# Patient Record
Sex: FEMALE | Race: AMERICAN INDIAN OR ALASKA NATIVE | NOT HISPANIC OR LATINO | Employment: FULL TIME
[De-identification: names, ages, dates, MRNs, and addresses within clinical notes are randomized per-mention and may not be internally consistent; named-entity substitution may affect disease eponyms.]

---

## 2020-11-14 ENCOUNTER — HEALTH MAINTENANCE LETTER (OUTPATIENT)
Age: 30
End: 2020-11-14

## 2021-09-12 ENCOUNTER — HEALTH MAINTENANCE LETTER (OUTPATIENT)
Age: 31
End: 2021-09-12

## 2022-01-02 ENCOUNTER — HEALTH MAINTENANCE LETTER (OUTPATIENT)
Age: 32
End: 2022-01-02

## 2022-11-19 ENCOUNTER — HEALTH MAINTENANCE LETTER (OUTPATIENT)
Age: 32
End: 2022-11-19

## 2023-04-09 ENCOUNTER — HEALTH MAINTENANCE LETTER (OUTPATIENT)
Age: 33
End: 2023-04-09

## 2024-02-13 ENCOUNTER — HOSPITAL ENCOUNTER (EMERGENCY)
Facility: OTHER | Age: 34
Discharge: HOME | End: 2024-02-13
Attending: STUDENT IN AN ORGANIZED HEALTH CARE EDUCATION/TRAINING PROGRAM | Admitting: STUDENT IN AN ORGANIZED HEALTH CARE EDUCATION/TRAINING PROGRAM
Payer: COMMERCIAL

## 2024-02-13 ENCOUNTER — APPOINTMENT (OUTPATIENT)
Dept: ULTRASOUND IMAGING | Facility: OTHER | Age: 34
End: 2024-02-13
Attending: STUDENT IN AN ORGANIZED HEALTH CARE EDUCATION/TRAINING PROGRAM
Payer: COMMERCIAL

## 2024-02-13 VITALS
HEIGHT: 68 IN | TEMPERATURE: 99.1 F | SYSTOLIC BLOOD PRESSURE: 120 MMHG | RESPIRATION RATE: 18 BRPM | DIASTOLIC BLOOD PRESSURE: 79 MMHG | HEART RATE: 74 BPM | WEIGHT: 190 LBS | OXYGEN SATURATION: 100 % | BODY MASS INDEX: 28.79 KG/M2

## 2024-02-13 DIAGNOSIS — O03.9 MISCARRIAGE: ICD-10-CM

## 2024-02-13 LAB
ABO/RH(D): NORMAL
ALBUMIN UR-MCNC: NEGATIVE MG/DL
ANTIBODY SCREEN: NEGATIVE
APPEARANCE UR: CLEAR
BASOPHILS # BLD AUTO: 0 10E3/UL (ref 0–0.2)
BASOPHILS NFR BLD AUTO: 1 %
BILIRUB UR QL STRIP: NEGATIVE
COLOR UR AUTO: YELLOW
EOSINOPHIL # BLD AUTO: 0.1 10E3/UL (ref 0–0.7)
EOSINOPHIL NFR BLD AUTO: 1 %
ERYTHROCYTE [DISTWIDTH] IN BLOOD BY AUTOMATED COUNT: 11.7 % (ref 10–15)
GLUCOSE UR STRIP-MCNC: NEGATIVE MG/DL
HCG UR QL: POSITIVE
HCT VFR BLD AUTO: 40.5 % (ref 35–47)
HGB BLD-MCNC: 14.1 G/DL (ref 11.7–15.7)
HGB UR QL STRIP: ABNORMAL
HOLD SPECIMEN: NORMAL
IMM GRANULOCYTES # BLD: 0 10E3/UL
IMM GRANULOCYTES NFR BLD: 0 %
KETONES UR STRIP-MCNC: NEGATIVE MG/DL
LEUKOCYTE ESTERASE UR QL STRIP: NEGATIVE
LYMPHOCYTES # BLD AUTO: 1.2 10E3/UL (ref 0.8–5.3)
LYMPHOCYTES NFR BLD AUTO: 16 %
MCH RBC QN AUTO: 31.5 PG (ref 26.5–33)
MCHC RBC AUTO-ENTMCNC: 34.8 G/DL (ref 31.5–36.5)
MCV RBC AUTO: 91 FL (ref 78–100)
MONOCYTES # BLD AUTO: 0.4 10E3/UL (ref 0–1.3)
MONOCYTES NFR BLD AUTO: 6 %
NEUTROPHILS # BLD AUTO: 5.9 10E3/UL (ref 1.6–8.3)
NEUTROPHILS NFR BLD AUTO: 76 %
NITRATE UR QL: NEGATIVE
NRBC # BLD AUTO: 0 10E3/UL
NRBC BLD AUTO-RTO: 0 /100
PH UR STRIP: 5.5 [PH] (ref 5–9)
PLATELET # BLD AUTO: 220 10E3/UL (ref 150–450)
RBC # BLD AUTO: 4.47 10E6/UL (ref 3.8–5.2)
RBC URINE: <1 /HPF
SP GR UR STRIP: 1.01 (ref 1–1.03)
SPECIMEN EXPIRATION DATE: NORMAL
UROBILINOGEN UR STRIP-MCNC: NORMAL MG/DL
WBC # BLD AUTO: 7.7 10E3/UL (ref 4–11)
WBC URINE: <1 /HPF

## 2024-02-13 PROCEDURE — 85041 AUTOMATED RBC COUNT: CPT | Performed by: STUDENT IN AN ORGANIZED HEALTH CARE EDUCATION/TRAINING PROGRAM

## 2024-02-13 PROCEDURE — 76817 TRANSVAGINAL US OBSTETRIC: CPT

## 2024-02-13 PROCEDURE — 250N000013 HC RX MED GY IP 250 OP 250 PS 637: Performed by: STUDENT IN AN ORGANIZED HEALTH CARE EDUCATION/TRAINING PROGRAM

## 2024-02-13 PROCEDURE — 81001 URINALYSIS AUTO W/SCOPE: CPT | Performed by: STUDENT IN AN ORGANIZED HEALTH CARE EDUCATION/TRAINING PROGRAM

## 2024-02-13 PROCEDURE — 76801 OB US < 14 WKS SINGLE FETUS: CPT

## 2024-02-13 PROCEDURE — 99284 EMERGENCY DEPT VISIT MOD MDM: CPT | Performed by: STUDENT IN AN ORGANIZED HEALTH CARE EDUCATION/TRAINING PROGRAM

## 2024-02-13 PROCEDURE — 81025 URINE PREGNANCY TEST: CPT | Performed by: STUDENT IN AN ORGANIZED HEALTH CARE EDUCATION/TRAINING PROGRAM

## 2024-02-13 PROCEDURE — 36415 COLL VENOUS BLD VENIPUNCTURE: CPT | Performed by: STUDENT IN AN ORGANIZED HEALTH CARE EDUCATION/TRAINING PROGRAM

## 2024-02-13 PROCEDURE — 86900 BLOOD TYPING SEROLOGIC ABO: CPT | Performed by: STUDENT IN AN ORGANIZED HEALTH CARE EDUCATION/TRAINING PROGRAM

## 2024-02-13 PROCEDURE — 99284 EMERGENCY DEPT VISIT MOD MDM: CPT | Mod: 25

## 2024-02-13 RX ORDER — MISOPROSTOL 100 UG/1
600 TABLET ORAL ONCE
Status: COMPLETED | OUTPATIENT
Start: 2024-02-13 | End: 2024-02-13

## 2024-02-13 RX ADMIN — MISOPROSTOL 600 MCG: 100 TABLET ORAL at 18:24

## 2024-02-13 NOTE — ED PROVIDER NOTES
"  History     Chief Complaint   Patient presents with    Vaginal Bleeding - Pregnant       Flaquita Shetty is a 33 year old year  presenting with vaginal bleeding in pregnancy. This started approximately 3 hours ago starting with pinkish discharge and then she had another episode an hour or 2 later that was dripping blood.  She is not on any pads yet.  Severity described as mild.  Gestation age 13 weeks.  LMP .  Last intercourse 3 days ago.  She has some mild intermittent right lower quadrant pain.  Declines any medication for this currently.  She has been having slightly increased vaginal discharge that she has been having throughout this pregnancy today.  There is no back pain, nausea, vomiting, dysuria, light headedness. Has not attended prenatal obstetric care appointments. No known pregnancy complications to date.     Allergies   Allergen Reactions    Nitrofurantoin Hives     hives    Sulfa Antibiotics Hives and Swelling     States her lips became swollen. thrush on lips and hives form sulfa       There are no problems to display for this patient.      No past medical history on file.    No past surgical history on file.    No family history on file.         Medications:    Prenatal Vit-Fe Fumarate-FA (PRENATAL MULTIVITAMIN  PLUS IRON) 27-1 MG TABS        Review of Systems: See HPI for pertinent negatives and positives. All other systems reviewed and found to be negative.    Physical Exam   /79   Pulse 74   Temp 99.1  F (37.3  C) (Tympanic)   Resp 18   Ht 1.727 m (5' 8\")   Wt 86.2 kg (190 lb)   SpO2 100%   BMI 28.89 kg/m       General: awake, comfortable  HEENT: atraumatic  Respiratory: normal effort, clear to auscultation bilaterally  Cardiovascular: regular rate and rhythm, no murmurs  Abdomen: soft, nondistended, nontender, no guarding or rebound  Extremities: no deformities, edema, or tenderness  Skin: warm, dry, no rashes  Neuro: alert, no focal deficits  Pelvic: Deferred    ED " Course           Results for orders placed or performed during the hospital encounter of 02/13/24 (from the past 24 hour(s))   ABO/Rh type and screen    Narrative    The following orders were created for panel order ABO/Rh type and screen.  Procedure                               Abnormality         Status                     ---------                               -----------         ------                     Adult Type and Screen[[id number]]                            Final result                 Please view results for these tests on the individual orders.   CBC with platelets differential    Narrative    The following orders were created for panel order CBC with platelets differential.  Procedure                               Abnormality         Status                     ---------                               -----------         ------                     CBC with platelets and d...[[id number]]                      Final result                 Please view results for these tests on the individual orders.   Extra Tube    Narrative    The following orders were created for panel order Extra Tube.  Procedure                               Abnormality         Status                     ---------                               -----------         ------                     Extra Blue Top Tube[[id number]]                              Final result               Extra Serum Separator Tu...[[id number]]                      Final result                 Please view results for these tests on the individual orders.   Extra Tube    Narrative    The following orders were created for panel order Extra Tube.  Procedure                               Abnormality         Status                     ---------                               -----------         ------                     Extra Green Top (Lithium...[[id number]]                      Final result                 Please view results for these tests on the individual orders.    Extra Tube    Narrative    The following orders were created for panel order Extra Tube.  Procedure                               Abnormality         Status                     ---------                               -----------         ------                     Extra Heparinized Syringe[[id number]]                                                     Please view results for these tests on the individual orders.   Extra Blue Top Tube   Result Value Ref Range    Hold Specimen JIC    Extra Serum Separator Tube (SST)   Result Value Ref Range    Hold Specimen JIC    Extra Green Top (Lithium Heparin) Tube   Result Value Ref Range    Hold Specimen JIC    Adult Type and Screen   Result Value Ref Range    ABO/RH(D) A POS     Antibody Screen Negative Negative    SPECIMEN EXPIRATION DATE [id number]    CBC with platelets and differential   Result Value Ref Range    WBC Count 7.7 4.0 - 11.0 10e3/uL    RBC Count 4.47 3.80 - 5.20 10e6/uL    Hemoglobin 14.1 11.7 - 15.7 g/dL    Hematocrit 40.5 35.0 - 47.0 %    MCV 91 78 - 100 fL    MCH 31.5 26.5 - 33.0 pg    MCHC 34.8 31.5 - 36.5 g/dL    RDW 11.7 10.0 - 15.0 %    Platelet Count 220 150 - 450 10e3/uL    % Neutrophils 76 %    % Lymphocytes 16 %    % Monocytes 6 %    % Eosinophils 1 %    % Basophils 1 %    % Immature Granulocytes 0 %    NRBCs per 100 WBC 0 <1 /100    Absolute Neutrophils 5.9 1.6 - 8.3 10e3/uL    Absolute Lymphocytes 1.2 0.8 - 5.3 10e3/uL    Absolute Monocytes 0.4 0.0 - 1.3 10e3/uL    Absolute Eosinophils 0.1 0.0 - 0.7 10e3/uL    Absolute Basophils 0.0 0.0 - 0.2 10e3/uL    Absolute Immature Granulocytes 0.0 <=0.4 10e3/uL    Absolute NRBCs 0.0 10e3/uL   UA with Microscopic reflex to Culture    Specimen: Urine, Clean Catch   Result Value Ref Range    Color Urine Yellow Colorless, Straw, Light Yellow, Yellow    Appearance Urine Clear Clear    Glucose Urine Negative Negative mg/dL    Bilirubin Urine Negative Negative    Ketones Urine Negative Negative mg/dL     Specific Gravity Urine 1.007 1.000 - 1.030    Blood Urine Trace (A) Negative    pH Urine 5.5 5.0 - 9.0    Protein Albumin Urine Negative Negative mg/dL    Urobilinogen Urine Normal Normal, 2.0 mg/dL    Nitrite Urine Negative Negative    Leukocyte Esterase Urine Negative Negative    RBC Urine <1 <=2 /HPF    WBC Urine <1 <=5 /HPF    Narrative    Urine Culture not indicated   HCG qualitative urine   Result Value Ref Range    hCG Urine Qualitative Positive (A) Negative   US OB Transvaginal Only    Narrative    US OB TRANSVAGINAL ONLY    HISTORY: 1st trimester bleed. estimated 13 wk gestation .    COMPARISON: None.    TECHNIQUE: Transvaginal ultrasound of pelvis.    FINDINGS:    There is an intrauterine gestational sac containing a fetal pole with  a 15 mm crown rump length. No fetal cardiac motion is seen.       Impression    IMPRESSION:     Failure of early intrauterine pregnancy/missed .       RONAN JAMESON MD         SYSTEM ID:  [id number]       Medications   misoprostol (CYTOTEC) tablet 600 mcg (600 mcg Buccal $Given 24)       Assessments & Plan (with Medical Decision Making)     I have reviewed the nursing notes.    Evaluated for vaginal bleed in pregnancy. Vitals and hemoglobin unremarkable. Transvaginal ultrasound demonstrating non-viable pregnancy consistent with miscarriage/spontaneous . Rh positive and therefore Rhogam not given.  OB consult with recommendation for urgent OB follow up and can offer Cytotec 600 mcg buccal which was accepted by patient here in the ED.  OB referral placed.  Discharged home with attached instructions on diagnosis provided including ED return precautions.     I have reviewed the findings, diagnosis, plan and need for follow up with the patient.    Patient instructions:   Once again we are sorry about your miscarriage. You will be contacted very soon by OB/GYN to schedule follow-up appointment.    Please review attached instructions including reasons  to return to the emergency department.     Discharge Medication List as of 2/13/2024  6:21 PM          Final diagnoses:   Miscarriage       2/13/2024   [facility] [place]       Jonas Palomino MD  02/13/24 [id number]

## 2024-02-13 NOTE — ED TRIAGE NOTES
"Pt presents to ED with c/o vaginal bleeding at 13 wks gestation. Pt reports \"pink\" upon wiping this AM. Pt went to the bathroom this afternoon and reports \"blood dripping into the toilet.\" Pt denies clots. Pt has 4/10 RLQ pain.      Triage Assessment (Adult)       Row Name 02/13/24 1532          Cardiac WDL    Cardiac WDL WDL        Peripheral/Neurovascular WDL    Peripheral Neurovascular WDL WDL        Cognitive/Neuro/Behavioral WDL    Cognitive/Neuro/Behavioral WDL WDL                     "

## 2024-02-14 NOTE — DISCHARGE INSTRUCTIONS
Once again we are sorry about your miscarriage. You will be contacted very soon by OB/GYN to schedule follow-up appointment.    Please review attached instructions including reasons to return to the emergency department.

## 2024-02-15 ENCOUNTER — OFFICE VISIT (OUTPATIENT)
Dept: OBGYN | Facility: OTHER | Age: 34
End: 2024-02-15
Attending: STUDENT IN AN ORGANIZED HEALTH CARE EDUCATION/TRAINING PROGRAM
Payer: COMMERCIAL

## 2024-02-15 VITALS
WEIGHT: 188 LBS | DIASTOLIC BLOOD PRESSURE: 80 MMHG | BODY MASS INDEX: 28.59 KG/M2 | SYSTOLIC BLOOD PRESSURE: 132 MMHG | HEART RATE: 80 BPM

## 2024-02-15 DIAGNOSIS — O03.9 MISCARRIAGE: ICD-10-CM

## 2024-02-15 LAB — HCG INTACT+B SERPL-ACNC: 278 MIU/ML

## 2024-02-15 PROCEDURE — 36415 COLL VENOUS BLD VENIPUNCTURE: CPT | Mod: ZL

## 2024-02-15 PROCEDURE — 99203 OFFICE O/P NEW LOW 30 MIN: CPT

## 2024-02-15 PROCEDURE — 84702 CHORIONIC GONADOTROPIN TEST: CPT | Mod: ZL

## 2024-02-15 ASSESSMENT — PATIENT HEALTH QUESTIONNAIRE - PHQ9
10. IF YOU CHECKED OFF ANY PROBLEMS, HOW DIFFICULT HAVE THESE PROBLEMS MADE IT FOR YOU TO DO YOUR WORK, TAKE CARE OF THINGS AT HOME, OR GET ALONG WITH OTHER PEOPLE: VERY DIFFICULT
SUM OF ALL RESPONSES TO PHQ QUESTIONS 1-9: 17
SUM OF ALL RESPONSES TO PHQ QUESTIONS 1-9: 17

## 2024-02-15 NOTE — NURSING NOTE
Chief Complaint   Patient presents with    Follow Up     Miscarriage        Medication Reconciliation: complete        Shazia Woodward, NALDON

## 2024-02-15 NOTE — PROGRESS NOTES
OBGYN Office Visit    Chief Complaint: Miscarriage    History of Present Illness:  Ms. Flaquita Shetty is a 33 year old yo  here today due to concerns for a miscarriage. She reports she was in the ED and confirmed to have a miscarriage. Review of ED note shows that patient was noted to have a nonviable pregnancy and was given cytotec for passage. She reports she passed tissue last night. She is still having some bleeidng with some clots. Nothing overly concerning.     Past Medical History:  History reviewed. No pertinent past medical history.  She denies any chronic medical conditions: specifically denies asthma, HTN, DM    OB History:  2 term deliveries , .        GYN History:  No history of STIs  Last pap smear  Nil/HPV-   No history of abnormal pap smears    Past Surgical History:  No past surgical history on file.    Family History:  No family history on file.  Specifically denies breast, ovarian, endometrial and colon cancers in her family  She also is not aware of any familial thrombophilias and coagulopathies in her family    Social History:  Social History     Socioeconomic History    Marital status: Single     Spouse name: Not on file    Number of children: Not on file    Years of education: Not on file    Highest education level: Not on file   Occupational History    Not on file   Tobacco Use    Smoking status: Former     Types: Cigarettes    Smokeless tobacco: Never   Vaping Use    Vaping Use: Former   Substance and Sexual Activity    Alcohol use: Not Currently    Drug use: Never    Sexual activity: Yes   Other Topics Concern    Not on file   Social History Narrative    Not on file     Social Determinants of Health     Financial Resource Strain: Not on file   Food Insecurity: Not on file   Transportation Needs: Not on file   Physical Activity: Not on file   Stress: Not on file   Social Connections: Not on file   Interpersonal Safety: Not on file   Housing Stability: Not on file     She  lives at home with significant other and children.   No tobacco, alcohol or drug use in this pregnancy    Exam:  /80   Pulse 80   Wt 85.3 kg (188 lb)   BMI 28.59 kg/m    Physical Exam  /80   Pulse 80   Wt 85.3 kg (188 lb)   BMI 28.59 kg/m    Gen: Well-appearing, no acute distressed, well-groomed, alert  Pulm: nonlabored easily talks in full sentences      Labs:  hCG Urine Qualitative   Date Value Ref Range Status   2024 Positive (A) Negative Final     Comment:     This test is for screening purposes.  Results should be interpreted along with the clinical picture.  Confirmation testing is available if warranted by ordering [id number], HCG Quantitative Pregnancy.       US results:  24: there is an intrauterine gestational sac containing a fetal pole with  a 15 mm crown rump length. No fetal cardiac motion is seen.     Assessment:  Ms. Flaquita Shetty is a 33 year old yo  here today following miscarriage with cytotec use for passage. She notes she passed tissue at home last night. She is A+ blood type.       Plan:  # Failed pregnancy: spontaneous miscarriage  We discussed that a great amount of miscarriages are caused due to genetic abnormalities of fetus. We also discussed possibility of increased risk of future miscarriages as well. Recommend HCG level today to have baseline. Discussed expected bleeding patterns and when to contact the office. She is in agreement with this plan. IF patient is to have positive pregnancy test in the future recommend serial HCGs for trending.       VIVIAN Marino CNP on 2/15/2024 at 9:20 AM

## 2024-04-30 ENCOUNTER — HOSPITAL ENCOUNTER (OUTPATIENT)
Dept: GENERAL RADIOLOGY | Facility: OTHER | Age: 34
Discharge: HOME | End: 2024-04-30
Attending: STUDENT IN AN ORGANIZED HEALTH CARE EDUCATION/TRAINING PROGRAM
Payer: COMMERCIAL

## 2024-04-30 ENCOUNTER — OFFICE VISIT (OUTPATIENT)
Dept: FAMILY MEDICINE | Facility: OTHER | Age: 34
End: 2024-04-30
Attending: NURSE PRACTITIONER
Payer: COMMERCIAL

## 2024-04-30 VITALS
WEIGHT: 196.2 LBS | OXYGEN SATURATION: 97 % | RESPIRATION RATE: 14 BRPM | DIASTOLIC BLOOD PRESSURE: 74 MMHG | SYSTOLIC BLOOD PRESSURE: 108 MMHG | HEART RATE: 64 BPM | HEIGHT: 68 IN | BODY MASS INDEX: 29.73 KG/M2 | TEMPERATURE: 99.5 F

## 2024-04-30 DIAGNOSIS — M54.42 ACUTE BILATERAL LOW BACK PAIN WITH LEFT-SIDED SCIATICA: ICD-10-CM

## 2024-04-30 DIAGNOSIS — M54.42 ACUTE BILATERAL LOW BACK PAIN WITH LEFT-SIDED SCIATICA: Primary | ICD-10-CM

## 2024-04-30 LAB — HCG UR QL: NEGATIVE

## 2024-04-30 PROCEDURE — 99213 OFFICE O/P EST LOW 20 MIN: CPT | Performed by: STUDENT IN AN ORGANIZED HEALTH CARE EDUCATION/TRAINING PROGRAM

## 2024-04-30 PROCEDURE — 250N000011 HC RX IP 250 OP 636

## 2024-04-30 PROCEDURE — 96372 THER/PROPH/DIAG INJ SC/IM: CPT

## 2024-04-30 PROCEDURE — 81025 URINE PREGNANCY TEST: CPT | Mod: ZL | Performed by: STUDENT IN AN ORGANIZED HEALTH CARE EDUCATION/TRAINING PROGRAM

## 2024-04-30 PROCEDURE — 72100 X-RAY EXAM L-S SPINE 2/3 VWS: CPT

## 2024-04-30 RX ORDER — KETOROLAC TROMETHAMINE 30 MG/ML
30 INJECTION, SOLUTION INTRAMUSCULAR; INTRAVENOUS ONCE
Status: COMPLETED | OUTPATIENT
Start: 2024-04-30 | End: 2024-04-30

## 2024-04-30 RX ADMIN — KETOROLAC TROMETHAMINE 30 MG: 30 INJECTION, SOLUTION INTRAMUSCULAR at 11:40

## 2024-04-30 ASSESSMENT — PAIN SCALES - GENERAL: PAINLEVEL: EXTREME PAIN (8)

## 2024-04-30 NOTE — NURSING NOTE
"Chief Complaint   Patient presents with    Back Pain     Low back- down to hips and bottom     Patient presents today with low back pain on left side that shoots down into her hip and bottom. She said that her left cheek is numb. This began yesterday, she doesn't know what could have caused this. However, she did mention her family has a history of disgenerative disc disease. She also has had spinal fusions done in her upper neck.       Initial /74 (BP Location: Left arm, Patient Position: Sitting, Cuff Size: Adult Large)   Pulse 64   Temp 99.5  F (37.5  C) (Tympanic)   Resp 14   Ht 1.727 m (5' 8\")   Wt 89 kg (196 lb 3.2 oz)   LMP 04/14/2024 (Exact Date)   SpO2 97%   Breastfeeding No   BMI 29.83 kg/m   Estimated body mass index is 29.83 kg/m  as calculated from the following:    Height as of this encounter: 1.727 m (5' 8\").    Weight as of this encounter: 89 kg (196 lb 3.2 oz).     FOOD SECURITY SCREENING QUESTIONS:    The next two questions are to help us understand your food security.  If you are feeling you need any assistance in this area, we have resources available to support you today.    Hunger Vital Signs:  Within the past 12 months we worried whether our food would run out before we got money to buy more. Never  Within the past 12 months the food we bought just didn't last and we didn't have money to get more. Never      Fortunato Simon    "

## 2024-04-30 NOTE — PROGRESS NOTES
Assessment & Plan     (M54.42) Acute bilateral low back pain with left-sided sciatica  (primary encounter diagnosis)    Comment: Low back pain, left-sided sciatica.  This happened after standing from a seated position on the floor.  Possible strain vs. Disc/nerve involvement.  With history of cervical abnormalities, x-ray imaging was obtained of the lower back today.  There is some loss of disc height between L5 and S1 per radiologist read.  She does describe symptoms of sciatica.  No evidence of pudendal nerve involvement at this time.  She is afebrile, not tachycardic. Not on contraceptives, HCG negative at this time.     Plan: XR Lumbar Spine 2/3 Views, Pregnancy, Urine         (HCG), ketorolac (TORADOL) injection 30 mg     Toradol 30 mg was given in the office today.  Continue NSAIDs and Tylenol at home.  Topical heat and/or ice.  Follow-up with primary care for persisting symptoms.  Go to the ER for worsening symptoms.  She is comfortable and agreeable with this plan.      Bambi Sams is a 34 year old, presenting for the following health issues:  Back Pain (Low back- down to hips and bottom)    HPI     Patient presents today with concerns of low back pain that now goes down into her left hip as well as buttock.  She states some numbness into the left buttock.  Some difficulty with standing from the chair, walking due to the pain.  She states that started yesterday after she was trying to stand up from being on the floor.  She notes she did take ibuprofen yesterday and today a couple hours ago.  She has also been using heat and ice.      No fevers, skin color changes.  No loss of bowel or bladder nor numbness into the groin.      Review of Systems  Constitutional, HEENT, cardiovascular, pulmonary, gi and gu systems are negative, except as otherwise noted.          Objective    /74 (BP Location: Left arm, Patient Position: Sitting, Cuff Size: Adult Large)   Pulse 64   Temp 99.5  F (37.5  C)  "(Tympanic)   Resp 14   Ht 1.727 m (5' 8\")   Wt 89 kg (196 lb 3.2 oz)   LMP 04/14/2024 (Exact Date)   SpO2 97%   Breastfeeding No   BMI 29.83 kg/m    Body mass index is 29.83 kg/m .    Physical Exam   GENERAL: alert and no distress  RESP: no increased work of breathing  MS: no gross musculoskeletal defects noted, no edema, ecchymosis.  Tenderness palpation over the lower spine extending laterally to the left SI joint space, no tenderness in the right joint space, decreased range of motion with extension of the spine, appropriate range of motion with flexion of the spine, increased pain with sidebending as well as lateral rotation of the spine, ambulation even and symmetric, no foot drop, patellar reflexes 2+, light touch sensation intact in distal lower extremities bilaterally    Results for orders placed or performed during the hospital encounter of 04/30/24   XR Lumbar Spine 2/3 Views     Status: None    Narrative    EXAM: XR LUMBAR SPINE 2/3 VIEWS, 4/30/2024 11:53 AM     HISTORY: acute pain in lower back, radiating to left side; Acute  bilateral low back pain with left-sided sciatica    TECHNIQUE: AP, lateral and L5-S1 coned in view of the lumbar spine    COMPARISON: None.    FINDINGS:    5 lumbar type vertebral bodies are identified. There is no acute  osseous abnormality. Normal lordotic curvature of the lumbar spine is  preserved. The vertebral body alignment is maintained.    Moderate disc space loss at L5-S1. Mild to moderate facet hypertrophy  at L5-S1.     Lung bases are clear. Nonobstructive bowel gas pattern.      Impression    IMPRESSION:  Lumbar spondylosis centered around L5-S1. No acute osseous  abnormality.    GABRIEL HAMPTON MD         SYSTEM ID:  [id number]   Results for orders placed or performed in visit on 04/30/24   Pregnancy, Urine (HCG)     Status: Normal   Result Value Ref Range    hCG Urine Qualitative Negative Negative           Signed Electronically by: Rivka Cai PA-C    "

## 2024-04-30 NOTE — PATIENT INSTRUCTIONS
Low back pain    Toradol 30 mg was given in the office today.  No NSAIDs until this evening.  After that, resume ibuprofen or naproxen.  Take with food.    Tylenol 1000 mg every 6 hours as needed.    Heat, ice, topical application of medications as needed.    Follow-up with primary care in 1-2 weeks.    Go to the ER for worsening symptoms.

## 2024-05-10 ENCOUNTER — OFFICE VISIT (OUTPATIENT)
Dept: FAMILY MEDICINE | Facility: OTHER | Age: 34
End: 2024-05-10
Attending: PHYSICIAN ASSISTANT
Payer: COMMERCIAL

## 2024-05-10 VITALS
TEMPERATURE: 97.8 F | BODY MASS INDEX: 29.5 KG/M2 | RESPIRATION RATE: 16 BRPM | WEIGHT: 194 LBS | DIASTOLIC BLOOD PRESSURE: 70 MMHG | SYSTOLIC BLOOD PRESSURE: 120 MMHG | HEART RATE: 72 BPM

## 2024-05-10 DIAGNOSIS — M54.42 ACUTE BILATERAL LOW BACK PAIN WITH LEFT-SIDED SCIATICA: Primary | ICD-10-CM

## 2024-05-10 PROCEDURE — 99213 OFFICE O/P EST LOW 20 MIN: CPT | Performed by: PHYSICIAN ASSISTANT

## 2024-05-10 RX ORDER — PREDNISONE 20 MG/1
TABLET ORAL
Qty: 10 TABLET | Refills: 0 | Status: SHIPPED | OUTPATIENT
Start: 2024-05-10

## 2024-05-10 RX ORDER — METHOCARBAMOL 500 MG/1
500 TABLET, FILM COATED ORAL 4 TIMES DAILY PRN
Qty: 60 TABLET | Refills: 0 | Status: SHIPPED | OUTPATIENT
Start: 2024-05-10

## 2024-05-10 RX ORDER — MELOXICAM 15 MG/1
15 TABLET ORAL DAILY
Qty: 30 TABLET | Refills: 0 | Status: SHIPPED | OUTPATIENT
Start: 2024-05-10

## 2024-05-10 ASSESSMENT — ANXIETY QUESTIONNAIRES
7. FEELING AFRAID AS IF SOMETHING AWFUL MIGHT HAPPEN: NOT AT ALL
5. BEING SO RESTLESS THAT IT IS HARD TO SIT STILL: NOT AT ALL
4. TROUBLE RELAXING: NOT AT ALL
2. NOT BEING ABLE TO STOP OR CONTROL WORRYING: NOT AT ALL
6. BECOMING EASILY ANNOYED OR IRRITABLE: NOT AT ALL
3. WORRYING TOO MUCH ABOUT DIFFERENT THINGS: NOT AT ALL
7. FEELING AFRAID AS IF SOMETHING AWFUL MIGHT HAPPEN: NOT AT ALL
GAD7 TOTAL SCORE: 0
1. FEELING NERVOUS, ANXIOUS, OR ON EDGE: NOT AT ALL
8. IF YOU CHECKED OFF ANY PROBLEMS, HOW DIFFICULT HAVE THESE MADE IT FOR YOU TO DO YOUR WORK, TAKE CARE OF THINGS AT HOME, OR GET ALONG WITH OTHER PEOPLE?: NOT DIFFICULT AT ALL
GAD7 TOTAL SCORE: 0
IF YOU CHECKED OFF ANY PROBLEMS ON THIS QUESTIONNAIRE, HOW DIFFICULT HAVE THESE PROBLEMS MADE IT FOR YOU TO DO YOUR WORK, TAKE CARE OF THINGS AT HOME, OR GET ALONG WITH OTHER PEOPLE: NOT DIFFICULT AT ALL
GAD7 TOTAL SCORE: 0

## 2024-05-10 ASSESSMENT — PATIENT HEALTH QUESTIONNAIRE - PHQ9
SUM OF ALL RESPONSES TO PHQ QUESTIONS 1-9: 2
10. IF YOU CHECKED OFF ANY PROBLEMS, HOW DIFFICULT HAVE THESE PROBLEMS MADE IT FOR YOU TO DO YOUR WORK, TAKE CARE OF THINGS AT HOME, OR GET ALONG WITH OTHER PEOPLE: NOT DIFFICULT AT ALL
SUM OF ALL RESPONSES TO PHQ QUESTIONS 1-9: 2

## 2024-05-10 ASSESSMENT — PAIN SCALES - GENERAL: PAINLEVEL: SEVERE PAIN (7)

## 2024-05-10 NOTE — PATIENT INSTRUCTIONS
Please refer to your AVS for follow up and pain/symptoms management recommendations (I.e.: medications, helpful conservative treatment modalities, appropriate follow up if need to a specialist or family practice, etc.). Please return to urgent care if your symptoms change or worsen.     Back Pain/Strain - you were seen in the urgent care today for back pain - your AVS will contain additional information as well in regards to your diagnosis.   -For back pain, we recommend alternating tylenol and Ibuprofen as needed (if you are able to take this medications the recommendation is to alternate every 4 hours as needed. I.e.: Ibuprofen at 8am, Tylenol 12pm, Ibuprofen 4pm, etc.).    -Daily maximum of Tylenol is 4000mg (recommend staying under 3000mg)   -Daily maximum of Ibuprofen is 3200 mg  --If prescribed Toradol, you may alternate this with Tylenol every 4-6 hours, please do NOT take any additional antiinflammatories such as Naproxen/Aleve, Motrin/Ibuprofen as these are in the same antiinflammatory class as Toradol  -You may have been placed on a muscle relaxer and/or steroid as well, take these as directed.   -Rest and heat are recommended. You may also try topical Capsaicin - may get hot, so test it on a smaller area of skin    EXAM: XR LUMBAR SPINE 2/3 VIEWS, 4/30/2024 11:53 AM      HISTORY: acute pain in lower back, radiating to left side; Acute  bilateral low back pain with left-sided sciatica     TECHNIQUE: AP, lateral and L5-S1 coned in view of the lumbar spine     COMPARISON: None.     FINDINGS:     5 lumbar type vertebral bodies are identified. There is no acute  osseous abnormality. Normal lordotic curvature of the lumbar spine is  preserved. The vertebral body alignment is maintained.     Moderate disc space loss at L5-S1. Mild to moderate facet hypertrophy  at L5-S1.      Lung bases are clear. Nonobstructive bowel gas pattern.                                                                       IMPRESSION:  Lumbar spondylosis centered around L5-S1. No acute osseous  abnormality.  - If placed on a steroid, please take in the morning with food  - If placed on a muscle relaxer - please avoid driving while on medication as may cause sedation  - Be mindful of lifting and posture.  - As soon as tolerated, stretch.  - Consider chiropractor, massage, acupuncture.    * Most back pain will go away on it's own in 6-8 weeks. Follow up with family practice with persistent symptoms in 7-10 days.   ** Must be rechecked with: numbness in groin, loss of bowel or bladder function.

## 2024-05-10 NOTE — PROGRESS NOTES
"  Assessment & Plan       ICD-10-CM    1. Acute bilateral low back pain with left-sided sciatica  M54.42 methocarbamol (ROBAXIN) 500 MG tablet     predniSONE (DELTASONE) 20 MG tablet     meloxicam (MOBIC) 15 MG tablet        Vital stable. Acute sciatic flare of lumbosacral spine. Reviewed imaging personally from x-rays on 4/30/24 showing mild L5-S1 loss of disc height, otherwise, imaging is stable. Will place on corticosteroid: prednisone, muscle relaxer: methocarbamol (Robaxin). Patient to take Prednisone in AM as can cause/lead to increase energy level. Discussed not driving/operating machinery while on muscle relaxer as can be sedating. Recommended ice and heat are recommended and topical Capsaicin. Discussed to be mindful of lifting and posture. May consider chiropractor, massage, acupuncture. May use over-the-counter Tylenol or ibuprofen PRN. If fevers, chills, worsening pains or signs of cauda equina syndrome occur (saddle anesthesia, loss of bowel or bladder function, etc.) patient agreeable to follow up promptly. Discussed that back pain can last 6-8 weeks. Patient is in agreement and understanding of the above treatment plan. All questions and concerns were addressed and answered to patient's satisfaction. AVS reviewed with patient.     BMI  Estimated body mass index is 29.5 kg/m  as calculated from the following:    Height as of 4/30/24: 1.727 m (5' 8\").    Weight as of this encounter: 88 kg (194 lb).   Weight management plan: Discussed healthy diet and exercise guidelines    See Patient Instructions    Return if symptoms worsen or fail to improve.    Bambi Sams is a 34 year old, presenting for the following health issues:  Musculoskeletal Problem        5/10/2024    12:37 PM   Additional Questions   Roomed by tennille acevedo lpn   Accompanied by son     Musculoskeletal Problem  This is a new problem. The current episode started 1 to 4 weeks ago. The problem occurs constantly. The problem has been unchanged. " "Nothing aggravates the symptoms. She has tried NSAIDs, position changes, rest, heat and ice for the symptoms. The treatment provided mild relief.      visit 4/30/24 low back pain onset on 4/29/24. Sciatica pain down buttock. Difficulty walking and rising from seated position. + heat, ice and NSAIDS. X-ray showing mild L5-S1 disc height loss per radiology from films on 4/30/24. Given toradol 30 mg in  and to continue RICE and heat therapy.     Today, pain continues to be in the low back, midline, with left sided sciatica that radiates from left buttock down to mid lateral thigh, then down to left lateral foot. Pain is worse with positional changes and prolonged sitting. No falls, injuries or trauma. No popping, snapping or crunching. No previous surgeries. She had tried chiropractic care - [facility], she is going every 2 days, for manipulations/adjustments and \"shock therapy\". + previous cervical fusion, did well on Robaxin/Methocarbamol after surgery.     No fevers, weight loss, bowel / bladder incontinence or localized weakness.    Review of Systems  Constitutional, HEENT, cardiovascular, pulmonary, GI, , musculoskeletal, neuro, skin, endocrine and psych systems are negative, except as otherwise noted.      Objective    /70 (BP Location: Right arm, Patient Position: Sitting, Cuff Size: Adult Large)   Pulse 72   Temp 97.8  F (36.6  C) (Tympanic)   Resp 16   Wt 88 kg (194 lb)   LMP 04/14/2024 (Exact Date)   BMI 29.50 kg/m    Body mass index is 29.5 kg/m .  Physical Exam   GENERAL: alert and no distress  EYES: Eyes grossly normal to inspection, PERRL and conjunctivae and sclerae normal  RESP: lungs clear to auscultation - no rales, rhonchi or wheezes  CV: regular rate and rhythm, normal S1 S2, no S3 or S4, no murmur, click or rub, no peripheral edema  MS:   Thoracic/Lumbar Spine:  Inspection:    Lordosis: Normal   Kyphosis: Normal  Tenderness: lumbar spinous processes, left para lumbar " muscles  ROM: full lateral bending and rotation of the thoracolumbar spine. Mildly diminished flexion and extension.   Strength: gastrocsoleus   5/5, hamstrings  5/5, quadriceps  5/5, tibialis anterior  5/5, peroneals  5/5  Special Test: positive left straight leg raise    Patient knows own name, what time of day it is and what building we are in. CNII-XII intact. Gets in and out of chair unassisted.  Sensation at UE and LE intact to light touch.     SKIN: no suspicious lesions or rashes  NEURO: Normal strength and tone, mentation intact and speech normal  PSYCH: mentation appears normal, affect normal/bright    Signed Electronically by: Robyn Lopes PA-C    Answers submitted by the patient for this visit:  Patient Health Questionnaire (Submitted on 5/10/2024)  If you checked off any problems, how difficult have these problems made it for you to do your work, take care of things at home, or get along with other people?: Not difficult at all  PHQ9 TOTAL SCORE: 2  JESSIE-7 (Submitted on 5/10/2024)  JESSIE 7 TOTAL SCORE: 0

## 2024-05-10 NOTE — NURSING NOTE
"Patient presents to the clinic for low back pain.    FOOD SECURITY SCREENING QUESTIONS:    The next two questions are to help us understand your food security.  If you are feeling you need any assistance in this area, we have resources available to support you today.    Hunger Vital Signs:  Within the past 12 months we worried whether our food would run out before we got money to buy more. Never  Within the past 12 months the food we bought just didn't last and we didn't have money to get more. Never    Advance Care Directive on file? no  Advance Care Directive provided to patient? Declined.      Chief Complaint   Patient presents with    Musculoskeletal Problem       Initial /70 (BP Location: Right arm, Patient Position: Sitting, Cuff Size: Adult Large)   Pulse 72   Temp 97.8  F (36.6  C) (Tympanic)   Resp 16   Wt 88 kg (194 lb)   LMP 04/14/2024 (Exact Date)   BMI 29.50 kg/m   Estimated body mass index is 29.5 kg/m  as calculated from the following:    Height as of 4/30/24: 1.727 m (5' 8\").    Weight as of this encounter: 88 kg (194 lb).  Medication Reconciliation: complete        Kerry Maddox LPN     "

## 2024-05-16 ENCOUNTER — VIRTUAL VISIT (OUTPATIENT)
Dept: OBGYN | Facility: OTHER | Age: 34
End: 2024-05-16
Payer: COMMERCIAL

## 2024-05-16 DIAGNOSIS — Z34.91 PRENATAL CARE IN FIRST TRIMESTER: Primary | ICD-10-CM

## 2024-05-16 PROCEDURE — 99207 PR OB VISIT-NO CHARGE - GICH ONLY: CPT | Mod: 93

## 2024-05-16 ASSESSMENT — ANXIETY QUESTIONNAIRES
GAD7 TOTAL SCORE: 0
7. FEELING AFRAID AS IF SOMETHING AWFUL MIGHT HAPPEN: NOT AT ALL
5. BEING SO RESTLESS THAT IT IS HARD TO SIT STILL: NOT AT ALL
1. FEELING NERVOUS, ANXIOUS, OR ON EDGE: NOT AT ALL
2. NOT BEING ABLE TO STOP OR CONTROL WORRYING: NOT AT ALL
3. WORRYING TOO MUCH ABOUT DIFFERENT THINGS: NOT AT ALL
6. BECOMING EASILY ANNOYED OR IRRITABLE: NOT AT ALL
GAD7 TOTAL SCORE: 0

## 2024-05-16 ASSESSMENT — PATIENT HEALTH QUESTIONNAIRE - PHQ9
SUM OF ALL RESPONSES TO PHQ QUESTIONS 1-9: 1
5. POOR APPETITE OR OVEREATING: NOT AT ALL

## 2024-05-16 NOTE — PROGRESS NOTES
"HPI:    This is a 34 year old female patient,  who presents for Pregnancy confirmation visit. Patient reports positive pregnancy test at home. Pregnancy *** planned. Father of the baby *** involved. Family and father of baby *** supportive of current pregnancy.    Obstetrical history, OB Questionnaire, and OB Demographics updated to the best of this nurse's ability based on patient report. PHQ-9 depression screening and routine Domestic Abuse screening completed. OB Education packet provided and reviewed by this nurse. All immediate questions and concerns answered.    Last menstrual period is reported as Patient's last menstrual period was 2024 (exact date).. KIRK based on LMP is ***.     Her cycles are {REGULAR:188787}.  Her last menstrual period was {NORMAL/ABNORMAL DEFAULT NORMAL:636914}.   History Since Last Menstrual Period: {:941}    Current concerns include: {PREGNANCYCONFIRMATION:752988}    Personal OB history includes: {PREGNANCY RISK:065178}  Previous OB Provider: ***  Previous Delivering Clinic: ***  Release of Records: ***    Current delivery plan: ***  Preferred OB Provider: ***  Current Primary Care Provider: ***  Pediatrician: ***    Have you travelled during the pregnancy?{No Yes:830822}  Have your sexual partner(s) travelled during the pregnancy?{No Yes:194026}  {If yes to either question, determine if travel was to an area with active Zika transmission. Click here for affected areas. If it was to an area with active Zika transmission, then screening for Zika indicated. }    HISTORY:   Marital Status: Single  Occupation: ***  Living in Household: {Living Status:54959044}    Past Medical History of Father of Baby: {MEDICAL HISTORY (OB):8114}    Past History:  Her past medical history No past medical history on file..      She has a history of  {OB HISTORY:317719}    Since her last LMP she {OB SUBSTANCE:767555::\"denies use of alcohol, tobacco and street drugs\"}.    Pap smear history: {PAP " ":746723}    STD/STI history: {STD AGENTS:899483}    STD/STI symptoms: {STD SYMPTOMS:987635}     Additional History: ***    Past medical, surgical, social and family history were reviewed and updated in EPIC.    Current BMI: Estimated body mass index is 29.5 kg/m  as calculated from the following:    Height as of 24: 1.727 m (5' 8\").    Weight as of 5/10/24: 88 kg (194 lb).   Based on facility policy, patient will *** need an anesthesia consult related to this pregnancy. Patients with BMI greater than 40 at 32 weeks gestation must have an anesthesia consult prior to delivery at [facility]. Patients with a BMI greater than 50 cannot plan delivery at [facility] and will require a coordination of care with an external Ob/Gyn provider.    Medications reviewed by this nurse. Current medication list:  Current Outpatient Medications   Medication Sig Dispense Refill    meloxicam (MOBIC) 15 MG tablet Take 1 tablet (15 mg) by mouth daily 30 tablet 0    methocarbamol (ROBAXIN) 500 MG tablet Take 1 tablet (500 mg) by mouth 4 times daily as needed for muscle spasms 60 tablet 0    predniSONE (DELTASONE) 20 MG tablet Take 2 tablets (40 mg) by mouth in the morning with food daily for 5 days 10 tablet 0     The following medications were recommended to be discontinued due to Pregnancy Category D status: ***  Patient informed to contact her primary care provider as soon as possible to discuss a safer alternative.    Risk factors:  Moderate and moderately severe risks (consult with OB/Gyn)  Previous fetal or  demise: {Yes & No:084589}  History of  delivery: {Yes & No:460009}  History of heart disease Class I: {Yes & No:046296}  Severe anemia, unresponsive to iron therapy: {Yes & No:613323}  Pelvic mass or neoplasm: {Yes & No:380532}  Previous : {Yes & No:124725}  Hyper/hypothyroidism: {Yes & No:392145}  History of postpartum hemorrhage requiring transfusion:{Yes & No:648319}  History of Placenta " Accreta: {Yes & No:895972}    High Risk (Pregnancy managed by OB/Gyn)  Multiple pregnancy: {Yes & No:935731}  Pre-gestational diabetes: {Yes & No:228037}  Chronic Hypertension: {Yes & No:579089}  Renal Failure: {Yes & No:385972}  Heart disease, class II or greater: {Yes & No:132683}  Rh Isoimmunization: {Yes & No:901926}  Chronic active hepatitis: {Yes & No:037639}  Convulsive disorder, poorly controlled: {Yes & No:101768}  Isoimmune thrombocytopenia: {Yes & No:168402}  Pre-term premature rupture of membranes: {Yes & No:115962}  Lupus or other autoimmune disorder: {Yes & No:149873}  Human Immunodeficiency Virus: {Yes & No:358670}    No results found for any visits on 24.     ASSESSMENT/PLAN:     No diagnosis found.    34 year old , Unknown of pregnancy with KIRK of Data Unavailable    Urine pregnancy test completed during this visit, results were as noted above.     Per standing orders and scope of practice of this nurse, patient will have the following orders placed and completed prior to initial OB visit with the appropriate provider:    --early ultrasound for dating and viability ordered for approximately 6-7 weeks gestation based on LMP    --Quantitative Beta HCG and progesterone monitoring if indicated    Counseling given:     - Recommended weight gain for pregnancy: {WGT GAIN:269195}   {BMI < 18.5  28-40 lbs   18.5 - 24.9 25-35   25 - 29.9 15-25   > 30  11-20}      PLAN/PATIENT INSTRUCTIONS:    {OB PLANS:470456}    {PREGNANCY PLAN (NL):618408}    Leonor Green RN.................................................. 2024 3:28 PM

## 2024-05-16 NOTE — CONFIDENTIAL NOTE
HPI:    This is a 34 year old female patient,  who presents today for OB Intake visit. Patient reports positive pregnancy test at home.     Obstetrical history and OB Questionnaire updated to the best of this nurse's ability based on patient report. PHQ-9 depression screening and routine Domestic Abuse screening completed. All immediate questions and concerns answered.    FOOD SECURITY SCREENING QUESTIONS:    The next two questions are to help us understand your food security.  If you are feeling you need any assistance in this area, we have resources available to support you today.    Hunger Vital Signs:  Within the past 12 months we worried whether our food would run out before we got money to buy more. Never  Within the past 12 months the food we bought just didn't last and we didn't have money to get more. Never    Last menstrual period is reported as Patient's last menstrual period was 2024 (exact date). KIRK based on LMP is Estimated Date of Delivery: 2025.  Her cycles are regular.  Her last menstrual period was normal.   Since her LMP, she has experienced  fatigue, headache, urinary urgency, lightheadedness, and urinary frequency.       OBSTETRIC HISTORY:    OB History    Para Term  AB Living   4 2 2 0 1 0   SAB IAB Ectopic Multiple Live Births   1 0 0 0 0      # Outcome Date GA Lbr Deon/2nd Weight Sex Type Anes PTL Lv   4 Current            3 SAB 2024           2 Term 06/10/18 39w1d  3.544 kg (7 lb 13 oz) F Vag-Spont Spinal N       Name: EMMETT IBRAHIM      Apgar1: 8  Apgar5: 9   1 Term 12/19/15 37w1d 18:27 / 03:01 3.118 kg (6 lb 14 oz) M Vag-Spont Spinal        Apgar1: 9  Apgar5: 10       Age of first pregnancy: 25  Previous OB Provider: [facility]   Previous Delivering Clinic: [place]  Release of Records: NONE NEEDED    Current delivery plan: [facility]  Preferred OB Provider: VIVIAN Marino CNP   Current Primary Care Provider: NONE  Pediatrician:   [facility]    Additional History:     Have you travelled during the pregnancy?No  Have your sexual partner(s) travelled during the pregnancy?No      HISTORY:   Planned Pregnancy: NO  Marital Status: Single  Occupation: TEACHER  Living in Household: Significant Other and Children    Father of the baby is involved.   Family and father of baby is supportive of current pregnancy.  Past Medical History of Father of Baby:No significant medical history    Past History:  Her past medical history History reviewed. No pertinent past medical history..      Her past surgical history:   Past Surgical History:   Procedure Laterality Date    SPINAL FUSION Bilateral 10/01/2021       She has a history of  pre-term delivery at 37 weeks    Since her LMP she denies use of alcohol, tobacco and street drugs.    Pap smear history: 3/21/2022 NORMAL     STD/STI history: HSV    STD/STI symptoms: no noticeable symptoms     Past medical, surgical, social and family history were reviewed and updated in EPIC.    Medications reviewed by this nurse. Current medication list:  Current Outpatient Medications   Medication Sig Dispense Refill    Prenatal Vit-Fe Fumarate-FA (PRENATAL MULTIVITAMIN  PLUS IRON) 27-1 MG TABS Take 1 tablet by mouth daily      meloxicam (MOBIC) 15 MG tablet Take 1 tablet (15 mg) by mouth daily (Patient not taking: Reported on 5/16/2024) 30 tablet 0    methocarbamol (ROBAXIN) 500 MG tablet Take 1 tablet (500 mg) by mouth 4 times daily as needed for muscle spasms (Patient not taking: Reported on 5/16/2024) 60 tablet 0    predniSONE (DELTASONE) 20 MG tablet Take 2 tablets (40 mg) by mouth in the morning with food daily for 5 days (Patient not taking: Reported on 5/16/2024) 10 tablet 0     The following medications were recommended to be discontinued due to Pregnancy Category D status: none  Patient informed to contact her primary care provider as soon as possible to discuss a safer alternative.    Influenza vaccine: Flu  Vaccine [facility] OB: Patient declined  COVID vaccine: COVID vaccine status [facility] OB: Patient would like to discuss with OB provider     Risk factors:  Moderate and moderately severe risks (consult with OB/Gyn)  Previous fetal or  demise: No  History of  delivery: Yes  History of heart disease Class I: No  Severe anemia, unresponsive to iron therapy: No  Pelvic mass or neoplasm: No  Previous : No  Hyper/hypothyroidism: No  History of postpartum hemorrhage requiring transfusion:No  History of Placenta Accreta: No    High Risk (Pregnancy managed by OB/Gyn)  Multiple pregnancy: No  Pre-gestational diabetes: No  Chronic Hypertension: No  Renal Failure: No  Heart disease, class II or greater: No  Rh Isoimmunization: No  Chronic active hepatitis: No  Convulsive disorder, poorly controlled: No  Isoimmune thrombocytopenia: No  Pre-term premature rupture of membranes: No  Lupus or other autoimmune disorder: No  Human Immunodeficiency Virus: No    hCG Urine Qualitative   Date Value Ref Range Status   2024 Negative Negative Final     Comment:     This test is for screening purposes.  Results should be interpreted along with the clinical picture.  Confirmation testing is available if warranted by ordering [id number], HCG Quantitative Pregnancy.       ASSESSMENT/PLAN:     No diagnosis found.    34 year old , 4w4d of pregnancy with KIRK of 2025, by Last Menstrual Period    Per standing orders and scope of practice of this nurse, patient will have the following orders placed and completed prior to initial OB visit with the appropriate provider:    --early ultrasound for dating and viability ordered for 6+ weeks gestation based on LMP    --Quantitative Beta HCG and progesterone monitoring if indicated    Counseling given:     - Recommended weight gain for pregnancy: 15-25 lbs.   BMI < 18.5  28-40 lbs   18.5 - 24.9 25-35   25 - 29.9 15-25   > 30  < 15       PLAN/PATIENT INSTRUCTIONS:    Normal  exercise.  Normal sexual activity.  Prenatal vitamins.  Anticipated weight gain.    follow-up appointment with VIVIAN Marino CNP  for pre-danielle care and take multivitamin or pre-danielle vitamins    Bridget Hauser RN.................................................. 2024 3:37 PM

## 2024-06-15 ENCOUNTER — HEALTH MAINTENANCE LETTER (OUTPATIENT)
Age: 34
End: 2024-06-15

## 2024-06-17 ENCOUNTER — PRENATAL OFFICE VISIT (OUTPATIENT)
Dept: OBGYN | Facility: OTHER | Age: 34
End: 2024-06-17
Payer: COMMERCIAL

## 2024-06-17 ENCOUNTER — HOSPITAL ENCOUNTER (OUTPATIENT)
Dept: ULTRASOUND IMAGING | Facility: OTHER | Age: 34
Discharge: HOME | End: 2024-06-17
Payer: COMMERCIAL

## 2024-06-17 VITALS
HEIGHT: 68 IN | WEIGHT: 198 LBS | BODY MASS INDEX: 30.01 KG/M2 | DIASTOLIC BLOOD PRESSURE: 70 MMHG | SYSTOLIC BLOOD PRESSURE: 120 MMHG | RESPIRATION RATE: 20 BRPM | HEART RATE: 63 BPM | OXYGEN SATURATION: 99 %

## 2024-06-17 DIAGNOSIS — Z34.91 PRENATAL CARE IN FIRST TRIMESTER: ICD-10-CM

## 2024-06-17 DIAGNOSIS — Z34.91 ENCOUNTER FOR PREGNANCY RELATED EXAMINATION, FIRST TRIMESTER: Primary | ICD-10-CM

## 2024-06-17 LAB
ALBUMIN MFR UR ELPH: <6 MG/DL
ALBUMIN UR-MCNC: NEGATIVE MG/DL
APPEARANCE UR: CLEAR
BILIRUB UR QL STRIP: NEGATIVE
C TRACH DNA SPEC QL PROBE+SIG AMP: NEGATIVE
COLOR UR AUTO: YELLOW
CREAT UR-MCNC: 24.8 MG/DL
GLUCOSE UR STRIP-MCNC: NEGATIVE MG/DL
HGB UR QL STRIP: NEGATIVE
KETONES UR STRIP-MCNC: NEGATIVE MG/DL
LEUKOCYTE ESTERASE UR QL STRIP: NEGATIVE
N GONORRHOEA DNA SPEC QL NAA+PROBE: NEGATIVE
NITRATE UR QL: NEGATIVE
PH UR STRIP: 6.5 [PH] (ref 5–9)
PROT/CREAT 24H UR: NORMAL MG/G{CREAT}
RBC URINE: 1 /HPF
SP GR UR STRIP: 1.01 (ref 1–1.03)
UROBILINOGEN UR STRIP-MCNC: NORMAL MG/DL
WBC URINE: <1 /HPF

## 2024-06-17 PROCEDURE — 84156 ASSAY OF PROTEIN URINE: CPT | Mod: ZL

## 2024-06-17 PROCEDURE — 81015 MICROSCOPIC EXAM OF URINE: CPT | Mod: ZL

## 2024-06-17 PROCEDURE — 76801 OB US < 14 WKS SINGLE FETUS: CPT

## 2024-06-17 PROCEDURE — 99207 PR OB VISIT-NO CHARGE - GICH ONLY: CPT

## 2024-06-17 PROCEDURE — 87491 CHLMYD TRACH DNA AMP PROBE: CPT | Mod: ZL

## 2024-06-17 PROCEDURE — 87086 URINE CULTURE/COLONY COUNT: CPT | Mod: ZL

## 2024-06-17 ASSESSMENT — PAIN SCALES - GENERAL: PAINLEVEL: NO PAIN (0)

## 2024-06-17 NOTE — NURSING NOTE
"Chief Complaint   Patient presents with    Prenatal Care     OB physical 9 w1d       Initial /70   Pulse 63   Resp 20   Ht 1.727 m (5' 8\")   Wt 89.8 kg (198 lb)   LMP 04/14/2024 (Exact Date)   SpO2 99%   BMI 30.11 kg/m   Estimated body mass index is 30.11 kg/m  as calculated from the following:    Height as of this encounter: 1.727 m (5' 8\").    Weight as of this encounter: 89.8 kg (198 lb).  Medication Reconciliation: complete    Emilia Cunningham LPN    "

## 2024-06-17 NOTE — PROGRESS NOTES
"New Obstetrics Visit    HPI: 34 year old  at 9w1d by LMP CW 9 week US here today for initial OB visit. Her LMP was 24. This was a unplanned pregnancy, but welcome. She has been experiencing nausea and swelling.       OBHx  OB History    Para Term  AB Living   4 2 2 0 1 0   SAB IAB Ectopic Multiple Live Births   1 0 0 0 0      # Outcome Date GA Lbr Deon/2nd Weight Sex Type Anes PTL Lv   4 Current            3 SAB 2024           2 Term 06/10/18 39w1d  3.544 kg (7 lb 13 oz) F Vag-Spont Spinal N       Name: EMMETT IBRAHIM      Apgar1: 8  Apgar5: 9   1 Term 12/19/15 37w1d 18:27 / 03:01 3.118 kg (6 lb 14 oz) M Vag-Spont Spinal        Apgar1: 9  Apgar5: 10       GYN History  - Menses: Patient's last menstrual period was 2024 (exact date)..   - Pap Smears: 3/21/22- NILM<   No results found for: \"PAP\"  - Hx STIs/UTIs: HSV    PMHx: History reviewed. No pertinent past medical history.   PSHx:   Past Surgical History:   Procedure Laterality Date    SPINAL FUSION Bilateral 10/01/2021      Meds:   Current Outpatient Medications   Medication Sig Dispense Refill    Prenatal Vit-Fe Fumarate-FA (PRENATAL MULTIVITAMIN  PLUS IRON) 27-1 MG TABS Take 1 tablet by mouth daily      meloxicam (MOBIC) 15 MG tablet Take 1 tablet (15 mg) by mouth daily (Patient not taking: Reported on 2024) 30 tablet 0    methocarbamol (ROBAXIN) 500 MG tablet Take 1 tablet (500 mg) by mouth 4 times daily as needed for muscle spasms (Patient not taking: Reported on 2024) 60 tablet 0    predniSONE (DELTASONE) 20 MG tablet Take 2 tablets (40 mg) by mouth in the morning with food daily for 5 days (Patient not taking: Reported on 2024) 10 tablet 0     No current facility-administered medications for this visit.     Allergies:     Allergies   Allergen Reactions    Nitrofurantoin Hives     hives    Sulfa Antibiotics Hives and Swelling     States her lips became swollen. thrush on lips and hives form sulfa " "      SocHx:   Social History     Tobacco Use    Smoking status: Former     Types: Cigarettes    Smokeless tobacco: Never   Vaping Use    Vaping status: Former    Substances: Nicotine, Flavoring    Devices: Disposable   Substance Use Topics    Alcohol use: Not Currently    Drug use: Never       FamHx:   Family History   Problem Relation Age of Onset    No Known Problems Mother     No Known Problems Father         ROS: 10-Point ROS negative except as noted in HPI      Physical Exam  /70   Pulse 63   Resp 20   Ht 1.727 m (5' 8\")   Wt 89.8 kg (198 lb)   LMP 2024 (Exact Date)   SpO2 99%   BMI 30.11 kg/m    Body mass index is 30.11 kg/m .  Gen: Well-appearing, NAD  HEENT: Normocephalic, atraumatic  Neck: Thyroid is not enlarged, no appreciable masses palpated. Non-tender  CV:  RRR, no m/r/g auscultated  Pulm: CTAB, no w/r/r auscultated  Abd: Soft, non-tender, non-distended  Ext: No LE edema, extremities warm and well perfused  Pelvic:  Normal appearing external female genitalia. No vaginal lesions. small discharge. Cervix normal, no lesions. Uterus is small, mobile, non-tender. No adnexal tenderness or masses    Labs:  none    Assessment/Plan:  Ms. Flaquita Shetty is a 34 year old  at 9w1d by LMP CW 9 week US, here for new OB visit. Pregnancy is complicated by obesity & HSV. We discussed the below recommendations and add on options as well as included any increased risk based on patient history with the patients choices and subsequent results if any are reflected below.  Specific education given on avoiding ibuprofen during pregnancy, avoiding overheating with hot tubs and saunas, and avoiding litter box changing. 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 Safe medications to use over the counter is provided today.     1..  Obesity: Pre E labs, A1C, ASA 81mg at 12 weeks, MFM, growth at 32 weeks, BPPs at 34 weeks   2. HSV: plan suppression   3. PNC: New OB " Labs ord'd  4. Genetics: desires will draw with labs next week.   5. Imaging: dating US at 9w3d  6. Immunizations: NA  7. Previous Delivery Hx: Vaginal X2 pelvis proven to [id number] GBS positive HX     Follow up in 4 weeks.      VIVIAN Marino CNP.

## 2024-06-18 LAB — BACTERIA UR CULT: NORMAL

## 2024-06-27 LAB
ABO/RH(D): NORMAL
ANTIBODY SCREEN: NEGATIVE
SPECIMEN EXPIRATION DATE: NORMAL

## 2024-06-28 ENCOUNTER — LAB (OUTPATIENT)
Dept: LAB | Facility: OTHER | Age: 34
End: 2024-06-28
Payer: COMMERCIAL

## 2024-06-28 DIAGNOSIS — Z34.91 ENCOUNTER FOR PREGNANCY RELATED EXAMINATION, FIRST TRIMESTER: ICD-10-CM

## 2024-06-28 LAB
ALBUMIN SERPL BCG-MCNC: 4.2 G/DL (ref 3.5–5.2)
ALP SERPL-CCNC: 59 U/L (ref 40–150)
ALT SERPL W P-5'-P-CCNC: 11 U/L (ref 0–50)
ANION GAP SERPL CALCULATED.3IONS-SCNC: 13 MMOL/L (ref 7–15)
AST SERPL W P-5'-P-CCNC: 16 U/L (ref 0–45)
BASOPHILS # BLD AUTO: 0.1 10E3/UL (ref 0–0.2)
BASOPHILS NFR BLD AUTO: 1 %
BILIRUB SERPL-MCNC: 0.4 MG/DL
BUN SERPL-MCNC: 10.4 MG/DL (ref 6–20)
CALCIUM SERPL-MCNC: 9.5 MG/DL (ref 8.6–10)
CHLORIDE SERPL-SCNC: 104 MMOL/L (ref 98–107)
CREAT SERPL-MCNC: 0.54 MG/DL (ref 0.51–0.95)
DEPRECATED HCO3 PLAS-SCNC: 21 MMOL/L (ref 22–29)
EGFRCR SERPLBLD CKD-EPI 2021: >90 ML/MIN/1.73M2
EOSINOPHIL # BLD AUTO: 0.1 10E3/UL (ref 0–0.7)
EOSINOPHIL NFR BLD AUTO: 1 %
ERYTHROCYTE [DISTWIDTH] IN BLOOD BY AUTOMATED COUNT: 11.9 % (ref 10–15)
GLUCOSE SERPL-MCNC: 115 MG/DL (ref 70–99)
HBA1C MFR BLD: 4.7 % (ref 4–6.2)
HBV SURFACE AG SERPL QL IA: NONREACTIVE
HCT VFR BLD AUTO: 39 % (ref 35–47)
HCV AB SERPL QL IA: NONREACTIVE
HGB BLD-MCNC: 13.4 G/DL (ref 11.7–15.7)
HIV 1+2 AB+HIV1 P24 AG SERPL QL IA: NONREACTIVE
IMM GRANULOCYTES # BLD: 0 10E3/UL
IMM GRANULOCYTES NFR BLD: 0 %
LYMPHOCYTES # BLD AUTO: 1.5 10E3/UL (ref 0.8–5.3)
LYMPHOCYTES NFR BLD AUTO: 18 %
MCH RBC QN AUTO: 30.9 PG (ref 26.5–33)
MCHC RBC AUTO-ENTMCNC: 34.4 G/DL (ref 31.5–36.5)
MCV RBC AUTO: 90 FL (ref 78–100)
MONOCYTES # BLD AUTO: 0.5 10E3/UL (ref 0–1.3)
MONOCYTES NFR BLD AUTO: 6 %
NEUTROPHILS # BLD AUTO: 6.4 10E3/UL (ref 1.6–8.3)
NEUTROPHILS NFR BLD AUTO: 74 %
NRBC # BLD AUTO: 0 10E3/UL
NRBC BLD AUTO-RTO: 0 /100
PLATELET # BLD AUTO: 215 10E3/UL (ref 150–450)
POTASSIUM SERPL-SCNC: 3.8 MMOL/L (ref 3.4–5.3)
PROT SERPL-MCNC: 7.4 G/DL (ref 6.4–8.3)
RBC # BLD AUTO: 4.33 10E6/UL (ref 3.8–5.2)
SODIUM SERPL-SCNC: 138 MMOL/L (ref 135–145)
WBC # BLD AUTO: 8.6 10E3/UL (ref 4–11)

## 2024-06-28 PROCEDURE — 85025 COMPLETE CBC W/AUTO DIFF WBC: CPT | Mod: ZL

## 2024-06-28 PROCEDURE — 83036 HEMOGLOBIN GLYCOSYLATED A1C: CPT | Mod: ZL

## 2024-06-28 PROCEDURE — 82374 ASSAY BLOOD CARBON DIOXIDE: CPT | Mod: ZL

## 2024-06-28 PROCEDURE — 36415 COLL VENOUS BLD VENIPUNCTURE: CPT | Mod: ZL

## 2024-06-28 PROCEDURE — 86803 HEPATITIS C AB TEST: CPT | Mod: ZL

## 2024-06-28 PROCEDURE — 86900 BLOOD TYPING SEROLOGIC ABO: CPT

## 2024-06-28 PROCEDURE — 86762 RUBELLA ANTIBODY: CPT | Mod: ZL

## 2024-06-28 PROCEDURE — 82565 ASSAY OF CREATININE: CPT | Mod: ZL

## 2024-06-28 PROCEDURE — 86780 TREPONEMA PALLIDUM: CPT | Mod: ZL

## 2024-06-28 PROCEDURE — 87340 HEPATITIS B SURFACE AG IA: CPT | Mod: ZL

## 2024-06-28 PROCEDURE — 87389 HIV-1 AG W/HIV-1&-2 AB AG IA: CPT | Mod: ZL

## 2024-06-29 LAB
RUBV IGG SERPL QL IA: 2.14 INDEX
RUBV IGG SERPL QL IA: POSITIVE
T PALLIDUM AB SER QL: NONREACTIVE

## 2024-07-09 LAB — SCANNED LAB RESULT: NORMAL

## 2024-07-10 ENCOUNTER — APPOINTMENT (OUTPATIENT)
Dept: ULTRASOUND IMAGING | Facility: OTHER | Age: 34
End: 2024-07-10
Attending: PHYSICIAN ASSISTANT
Payer: COMMERCIAL

## 2024-07-10 ENCOUNTER — HOSPITAL ENCOUNTER (EMERGENCY)
Facility: OTHER | Age: 34
Discharge: HOME | End: 2024-07-10
Attending: PHYSICIAN ASSISTANT | Admitting: PHYSICIAN ASSISTANT
Payer: COMMERCIAL

## 2024-07-10 VITALS
RESPIRATION RATE: 20 BRPM | BODY MASS INDEX: 30.01 KG/M2 | OXYGEN SATURATION: 97 % | DIASTOLIC BLOOD PRESSURE: 77 MMHG | WEIGHT: 198 LBS | HEART RATE: 67 BPM | SYSTOLIC BLOOD PRESSURE: 121 MMHG | TEMPERATURE: 98.1 F | HEIGHT: 68 IN

## 2024-07-10 DIAGNOSIS — O46.90 VAGINAL BLEEDING IN PREGNANCY: ICD-10-CM

## 2024-07-10 DIAGNOSIS — O46.8X9 SUBCHORIONIC BLEED: ICD-10-CM

## 2024-07-10 LAB
ABO/RH(D): NORMAL
ALBUMIN SERPL BCG-MCNC: 4.4 G/DL (ref 3.5–5.2)
ALP SERPL-CCNC: 61 U/L (ref 40–150)
ALT SERPL W P-5'-P-CCNC: 11 U/L (ref 0–50)
ANION GAP SERPL CALCULATED.3IONS-SCNC: 12 MMOL/L (ref 7–15)
ANTIBODY SCREEN: NEGATIVE
APTT PPP: 26 SECONDS (ref 22–38)
AST SERPL W P-5'-P-CCNC: 14 U/L (ref 0–45)
BASOPHILS # BLD AUTO: 0.1 10E3/UL (ref 0–0.2)
BASOPHILS NFR BLD AUTO: 1 %
BILIRUB SERPL-MCNC: 0.2 MG/DL
BUN SERPL-MCNC: 8.3 MG/DL (ref 6–20)
CALCIUM SERPL-MCNC: 9.6 MG/DL (ref 8.6–10)
CHLORIDE SERPL-SCNC: 101 MMOL/L (ref 98–107)
CREAT SERPL-MCNC: 0.57 MG/DL (ref 0.51–0.95)
DEPRECATED HCO3 PLAS-SCNC: 24 MMOL/L (ref 22–29)
EGFRCR SERPLBLD CKD-EPI 2021: >90 ML/MIN/1.73M2
EOSINOPHIL # BLD AUTO: 0.1 10E3/UL (ref 0–0.7)
EOSINOPHIL NFR BLD AUTO: 1 %
ERYTHROCYTE [DISTWIDTH] IN BLOOD BY AUTOMATED COUNT: 11.9 % (ref 10–15)
GLUCOSE SERPL-MCNC: 82 MG/DL (ref 70–99)
HCT VFR BLD AUTO: 39.6 % (ref 35–47)
HGB BLD-MCNC: 13.6 G/DL (ref 11.7–15.7)
HOLD SPECIMEN: NORMAL
HOLD SPECIMEN: NORMAL
IMM GRANULOCYTES # BLD: 0 10E3/UL
IMM GRANULOCYTES NFR BLD: 0 %
INR PPP: 1.05 (ref 0.85–1.15)
LYMPHOCYTES # BLD AUTO: 1.8 10E3/UL (ref 0.8–5.3)
LYMPHOCYTES NFR BLD AUTO: 19 %
MAGNESIUM SERPL-MCNC: 2 MG/DL (ref 1.7–2.3)
MCH RBC QN AUTO: 31 PG (ref 26.5–33)
MCHC RBC AUTO-ENTMCNC: 34.3 G/DL (ref 31.5–36.5)
MCV RBC AUTO: 90 FL (ref 78–100)
MONOCYTES # BLD AUTO: 0.6 10E3/UL (ref 0–1.3)
MONOCYTES NFR BLD AUTO: 7 %
NEUTROPHILS # BLD AUTO: 7 10E3/UL (ref 1.6–8.3)
NEUTROPHILS NFR BLD AUTO: 73 %
NRBC # BLD AUTO: 0 10E3/UL
NRBC BLD AUTO-RTO: 0 /100
PLATELET # BLD AUTO: 233 10E3/UL (ref 150–450)
POTASSIUM SERPL-SCNC: 3.9 MMOL/L (ref 3.4–5.3)
PROT SERPL-MCNC: 7.9 G/DL (ref 6.4–8.3)
RBC # BLD AUTO: 4.39 10E6/UL (ref 3.8–5.2)
SODIUM SERPL-SCNC: 137 MMOL/L (ref 135–145)
SPECIMEN EXPIRATION DATE: NORMAL
WBC # BLD AUTO: 9.7 10E3/UL (ref 4–11)

## 2024-07-10 PROCEDURE — 80053 COMPREHEN METABOLIC PANEL: CPT | Performed by: PHYSICIAN ASSISTANT

## 2024-07-10 PROCEDURE — 86900 BLOOD TYPING SEROLOGIC ABO: CPT | Performed by: PHYSICIAN ASSISTANT

## 2024-07-10 PROCEDURE — 99284 EMERGENCY DEPT VISIT MOD MDM: CPT | Performed by: PHYSICIAN ASSISTANT

## 2024-07-10 PROCEDURE — 96365 THER/PROPH/DIAG IV INF INIT: CPT | Performed by: PHYSICIAN ASSISTANT

## 2024-07-10 PROCEDURE — 96366 THER/PROPH/DIAG IV INF ADDON: CPT | Performed by: PHYSICIAN ASSISTANT

## 2024-07-10 PROCEDURE — 76801 OB US < 14 WKS SINGLE FETUS: CPT

## 2024-07-10 PROCEDURE — 85610 PROTHROMBIN TIME: CPT | Performed by: PHYSICIAN ASSISTANT

## 2024-07-10 PROCEDURE — 258N000003 HC RX IP 258 OP 636: Performed by: PHYSICIAN ASSISTANT

## 2024-07-10 PROCEDURE — 85004 AUTOMATED DIFF WBC COUNT: CPT | Performed by: PHYSICIAN ASSISTANT

## 2024-07-10 PROCEDURE — 85730 THROMBOPLASTIN TIME PARTIAL: CPT | Performed by: PHYSICIAN ASSISTANT

## 2024-07-10 PROCEDURE — 250N000011 HC RX IP 250 OP 636: Performed by: PHYSICIAN ASSISTANT

## 2024-07-10 PROCEDURE — 99285 EMERGENCY DEPT VISIT HI MDM: CPT | Mod: 25 | Performed by: PHYSICIAN ASSISTANT

## 2024-07-10 PROCEDURE — 83735 ASSAY OF MAGNESIUM: CPT | Performed by: PHYSICIAN ASSISTANT

## 2024-07-10 PROCEDURE — 36415 COLL VENOUS BLD VENIPUNCTURE: CPT | Performed by: PHYSICIAN ASSISTANT

## 2024-07-10 RX ORDER — MAGNESIUM SULFATE HEPTAHYDRATE 40 MG/ML
2 INJECTION, SOLUTION INTRAVENOUS ONCE
Status: COMPLETED | OUTPATIENT
Start: 2024-07-10 | End: 2024-07-10

## 2024-07-10 RX ADMIN — MAGNESIUM SULFATE HEPTAHYDRATE 2 G: 40 INJECTION, SOLUTION INTRAVENOUS at 18:48

## 2024-07-10 RX ADMIN — SODIUM CHLORIDE 1000 ML: 9 INJECTION, SOLUTION INTRAVENOUS at 18:48

## 2024-07-10 ASSESSMENT — ACTIVITIES OF DAILY LIVING (ADL)
ADLS_ACUITY_SCORE: 35
ADLS_ACUITY_SCORE: 35
ADLS_ACUITY_SCORE: 33
ADLS_ACUITY_SCORE: 35

## 2024-07-10 ASSESSMENT — COLUMBIA-SUICIDE SEVERITY RATING SCALE - C-SSRS
1. IN THE PAST MONTH, HAVE YOU WISHED YOU WERE DEAD OR WISHED YOU COULD GO TO SLEEP AND NOT WAKE UP?: NO
6. HAVE YOU EVER DONE ANYTHING, STARTED TO DO ANYTHING, OR PREPARED TO DO ANYTHING TO END YOUR LIFE?: NO
2. HAVE YOU ACTUALLY HAD ANY THOUGHTS OF KILLING YOURSELF IN THE PAST MONTH?: NO

## 2024-07-10 NOTE — ED TRIAGE NOTES
"ED Nursing Triage Note (General)   ________________________________    Flaquita Shetty is a 34 year old Female that presents to triage via private vehicle with complaints of vaginal bleeding.  Patient states she is 12 weeks gestation and states she has a hx of miscarriage.  Patient states she called the clinic this morning, however, states she was told she would need to go through the ER.  Patient states bleeding began yesterday and states bleeding has slowed, however, is still present. Patient states bleeding is associated with cramping.   Significant symptoms had onset 24 hour(s) ago.  /77   Pulse 67   Temp 98.1  F (36.7  C) (Tympanic)   Resp 20   Ht 1.727 m (5' 8\")   Wt 89.8 kg (198 lb)   LMP 04/14/2024 (Exact Date)   SpO2 97%   BMI 30.11 kg/m    Vital signs:  Temp: 98.1  F (36.7  C) Temp src: Tympanic BP: 121/77 Pulse: 67   Resp: 20 SpO2: 97 %     Height: 172.7 cm (5' 8\") Weight: 89.8 kg (198 lb)  Estimated body mass index is 30.11 kg/m  as calculated from the following:    Height as of this encounter: 1.727 m (5' 8\").    Weight as of this encounter: 89.8 kg (198 lb).  PRE HOSPITAL PRIOR LIVING SITUATION Spouse      Triage Assessment (Adult)       Row Name 07/10/24 5536          Triage Assessment    Airway WDL WDL        Respiratory WDL    Respiratory WDL WDL        Skin Circulation/Temperature WDL    Skin Circulation/Temperature WDL WDL        Cardiac WDL    Cardiac WDL WDL     Cardiac Rhythm NSR        Peripheral/Neurovascular WDL    Peripheral Neurovascular WDL WDL     Capillary Refill, General less than/equal to 3 secs        Cognitive/Neuro/Behavioral WDL    Cognitive/Neuro/Behavioral WDL WDL        [place] Coma Scale    Best Eye Response 4-->(E4) spontaneous     Best Motor Response 6-->(M6) obeys commands     Best Verbal Response 5-->(V5) oriented     [place] Coma Scale Score 15                     "

## 2024-07-11 NOTE — DISCHARGE INSTRUCTIONS
-Follow-up with OB/GYN for further evaluation.  -Return to the ER for any worsening of symptoms to include worse abdominal pain, worsening vaginal bleeding, fever, or any other concerning symptom

## 2024-07-11 NOTE — ED PROVIDER NOTES
EMERGENCY DEPARTMENT ENCOUNTER      NAME: Flaquita Shetty  AGE: 34 year old female  YOB: 1990  MRN: [id number]  EVALUATION DATE & TIME: 7/10/2024  5:38 PM    PCP: No Ref-Primary, Physician    ED PROVIDER: Nasir Baxter PA-C       CHIEF COMPLAINT:  Chief Complaint   Patient presents with    Vaginal Bleeding - Pregnant         HPI  Flaquita Shetty is a pleasant A1 34 year old female who presents to the ER today via private vehicle with her  for evaluation of vaginal bleeding.  Patient states she is roughly 12 weeks pregnant.  Patient states yesterday she has been having light bleeding and leg cramping.  Patient denying any clots.  No abdominal pain, fever, chills, nausea, vomiting, constipation, diarrhea, headache, lightheadedness.  Patient states that she has had 2 miscarriages in the past and presents to the ER for evaluation to make sure her baby is okay.      REVIEW OF SYSTEMS   Review of Systems  As above, otherwise ROS is unremarkable.      PAST MEDICAL HISTORY:  Past Medical History:   Diagnosis Date    Depressive disorder     Genital herpes     Partner also has genital herpes    Postpartum depression     Varicella          PAST SURGICAL HISTORY:  Past Surgical History:   Procedure Laterality Date    SPINAL FUSION Bilateral 10/01/2021         CURRENT MEDICATIONS:    Current Outpatient Medications   Medication Instructions    meloxicam (MOBIC) 15 mg, Oral, DAILY    methocarbamol (ROBAXIN) 500 mg, Oral, 4 TIMES DAILY PRN    predniSONE (DELTASONE) 20 MG tablet Take 2 tablets (40 mg) by mouth in the morning with food daily for 5 days    Prenatal Vit-Fe Fumarate-FA (PRENATAL MULTIVITAMIN  PLUS IRON) 27-1 MG TABS 1 tablet, Oral, DAILY         ALLERGIES:  Allergies   Allergen Reactions    Nitrofurantoin Hives     hives    Sulfa Antibiotics Hives and Swelling     States her lips became swollen. thrush on lips and hives form sulfa         FAMILY HISTORY:  Family History   Problem  Relation Age of Onset    No Known Problems Mother     No Known Problems Father          SOCIAL HISTORY:   Social History     Socioeconomic History    Marital status: Single   Occupational History     Comment: TEACHER [employer]   Tobacco Use    Smoking status: Former     Types: Cigarettes    Smokeless tobacco: Never   Vaping Use    Vaping status: Former    Substances: Nicotine, Flavoring    Devices: Disposable   Substance and Sexual Activity    Alcohol use: Not Currently    Drug use: Never    Sexual activity: Yes     Partners: Male     Social Determinants of Health     Financial Resource Strain: Low Risk  (5/10/2024)    Financial Resource Strain     Within the past 12 months, have you or your family members you live with been unable to get utilities (heat, electricity) when it was really needed?: No   Food Insecurity: Low Risk  (5/10/2024)    Food Insecurity     Within the past 12 months, did you worry that your food would run out before you got money to buy more?: No     Within the past 12 months, did the food you bought just not last and you didn t have money to get more?: No   Transportation Needs: Low Risk  (5/10/2024)    Transportation Needs     Within the past 12 months, has lack of transportation kept you from medical appointments, getting your medicines, non-medical meetings or appointments, work, or from getting things that you need?: No   Interpersonal Safety: Low Risk  (5/16/2024)    Interpersonal Safety     Do you feel physically and emotionally safe where you currently live?: Yes     Within the past 12 months, have you been hit, slapped, kicked or otherwise physically hurt by someone?: No     Within the past 12 months, have you been humiliated or emotionally abused in other ways by your partner or ex-partner?: No   Housing Stability: Low Risk  (5/10/2024)    Housing Stability     Do you have housing? : Yes     Are you worried about losing your housing?: No  "      ==================================================================================================================================    PHYSICAL EXAM    VITAL SIGNS: /77   Pulse 67   Temp 98.1  F (36.7  C) (Tympanic)   Resp 20   Ht 1.727 m (5' 8\")   Wt 89.8 kg (198 lb)   LMP 04/14/2024 (Exact Date)   SpO2 97%   BMI 30.11 kg/m      Patient Vitals for the past 24 hrs:   BP Temp Temp src Pulse Resp SpO2 Height Weight   07/10/24 1652 121/77 98.1  F (36.7  C) Tympanic 67 20 97 % 1.727 m (5' 8\") 89.8 kg (198 lb)       Physical Exam  Vitals and nursing note reviewed.   Constitutional:       Appearance: Normal appearance. She is not ill-appearing or diaphoretic.   HENT:      Nose: Nose normal.      Mouth/Throat:      Mouth: Mucous membranes are moist.      Pharynx: Oropharynx is clear.   Eyes:      Conjunctiva/sclera: Conjunctivae normal.   Cardiovascular:      Rate and Rhythm: Normal rate and regular rhythm.      Pulses: Normal pulses.      Heart sounds: Normal heart sounds.   Pulmonary:      Effort: Pulmonary effort is normal.      Breath sounds: Normal breath sounds.   Abdominal:      General: Abdomen is flat. Bowel sounds are normal.      Palpations: Abdomen is soft.      Tenderness: There is no abdominal tenderness. There is no guarding.   Genitourinary:     Comments: Pelvic exam deferred  Musculoskeletal:         General: Normal range of motion.      Cervical back: Normal range of motion.   Skin:     General: Skin is warm and dry.   Neurological:      General: No focal deficit present.      Mental Status: She is alert.   Psychiatric:         Mood and Affect: Mood normal.            ==================================================================================================================================    LABS & RADIOLOGY:  All pertinent labs reviewed and interpreted. Reviewed all pertinent imaging. Please see official radiology report.  Results for orders placed or performed during the " hospital encounter of 07/10/24   US OB < 14 Weeks Single    Impression    IMPRESSION:   Single living intrauterine gestation with gestational age of 12 weeks  3 days (KIRK: 1/19/2025) by LMP dates, concordant with current  ultrasound dating.      Thin crescentic collection of fluid which may be a small subchorionic  hemorrhage versus physiologic fluid adjacent to the amnion measuring  up to 3.9 x 2 x 0.3 cm.    ABIGAIL SHUKLA MD         SYSTEM ID:  [id number]   Result Value Ref Range    INR 1.05 0.85 - 1.15   Partial thromboplastin time   Result Value Ref Range    aPTT 26 22 - 38 Seconds   Comprehensive metabolic panel   Result Value Ref Range    Sodium 137 135 - 145 mmol/L    Potassium 3.9 3.4 - 5.3 mmol/L    Carbon Dioxide (CO2) 24 22 - 29 mmol/L    Anion Gap 12 7 - 15 mmol/L    Urea Nitrogen 8.3 6.0 - 20.0 mg/dL    Creatinine 0.57 0.51 - 0.95 mg/dL    GFR Estimate >90 >60 mL/min/1.73m2    Calcium 9.6 8.6 - 10.0 mg/dL    Chloride 101 98 - 107 mmol/L    Glucose 82 70 - 99 mg/dL    Alkaline Phosphatase 61 40 - 150 U/L    AST 14 0 - 45 U/L    ALT 11 0 - 50 U/L    Protein Total 7.9 6.4 - 8.3 g/dL    Albumin 4.4 3.5 - 5.2 g/dL    Bilirubin Total 0.2 <=1.2 mg/dL   CBC with platelets and differential   Result Value Ref Range    WBC Count 9.7 4.0 - 11.0 10e3/uL    RBC Count 4.39 3.80 - 5.20 10e6/uL    Hemoglobin 13.6 11.7 - 15.7 g/dL    Hematocrit 39.6 35.0 - 47.0 %    MCV 90 78 - 100 fL    MCH 31.0 26.5 - 33.0 pg    MCHC 34.3 31.5 - 36.5 g/dL    RDW 11.9 10.0 - 15.0 %    Platelet Count 233 150 - 450 10e3/uL    % Neutrophils 73 %    % Lymphocytes 19 %    % Monocytes 7 %    % Eosinophils 1 %    % Basophils 1 %    % Immature Granulocytes 0 %    NRBCs per 100 WBC 0 <1 /100    Absolute Neutrophils 7.0 1.6 - 8.3 10e3/uL    Absolute Lymphocytes 1.8 0.8 - 5.3 10e3/uL    Absolute Monocytes 0.6 0.0 - 1.3 10e3/uL    Absolute Eosinophils 0.1 0.0 - 0.7 10e3/uL    Absolute Basophils 0.1 0.0 - 0.2 10e3/uL    Absolute Immature  "Granulocytes 0.0 <=0.4 10e3/uL    Absolute NRBCs 0.0 10e3/uL   Extra Red Top Tube   Result Value Ref Range    Hold Specimen JIC    Extra Green Top (Lithium Heparin) Tube   Result Value Ref Range    Hold Specimen JIC    Result Value Ref Range    Magnesium 2.0 1.7 - 2.3 mg/dL   Adult Type and Screen   Result Value Ref Range    ABO/RH(D) A POS     Antibody Screen Negative Negative    SPECIMEN EXPIRATION DATE [id number]      US OB < 14 Weeks Single   Final Result   IMPRESSION:    Single living intrauterine gestation with gestational age of 12 weeks   3 days (KIRK: 1/19/2025) by LMP dates, concordant with current   ultrasound dating.        Thin crescentic collection of fluid which may be a small subchorionic   hemorrhage versus physiologic fluid adjacent to the amnion measuring   up to 3.9 x 2 x 0.3 cm.      ABIGAIL SHUKLA MD            SYSTEM ID:  [id number]            ==================================================================================================================================    ED COURSE, MEDICAL DECISION MAKING, FINAL IMPRESSION AND PLAN:     Assessment / Plan:  1. Vaginal bleeding in pregnancy    2. Subchorionic bleed        The patient was interviewed and examined.  HPI and physical exam as below.  Differential diagnosis and MDM Key Documentation Elements as below.  Vitals, triage note, and nursing notes were reviewed.  /77   Pulse 67   Temp 98.1  F (36.7  C) (Tympanic)   Resp 20   Ht 1.727 m (5' 8\")   Wt 89.8 kg (198 lb)   LMP 04/14/2024 (Exact Date)   SpO2 97%   BMI 30.11 kg/m      Differential includes but is not limited to threatened miscarriage, placenta previa, subchorionic hematoma, anemia    Patient is afebrile with otherwise normal vitals.  Patient is in no acute distress.  No abdominal tenderness.  Laboratory studies were otherwise reassuring.  Patient is a positive, currently no indication for RhoGAM.  Ultrasound today revealed a single living room pregnancy " measuring 12 weeks and 3 days.  Small collection of fluid concerning for small chorionic hemorrhage.  Without neck abdominal tenderness or abnormal laboratory findings to warrant emergent CT or ultrasound of the abdomen.    Plan is for patient to be discharged home with close follow-up with OB/GYN.  Referral entered.  If patient has any worsening of symptoms she is return to the ER for repeat evaluation.  Patient did state that she did have a small headache while here in the ER.  Patient was given IV fluids and IV magnesium with improvement in her headache.  Patient was discharged home in stable and improved condition.    Pertinent Labs & Imaging studies reviewed. (See chart for details)  Results for orders placed or performed during the hospital encounter of 07/10/24   US OB < 14 Weeks Single    Impression    IMPRESSION:   Single living intrauterine gestation with gestational age of 12 weeks  3 days (KIRK: 1/19/2025) by LMP dates, concordant with current  ultrasound dating.      Thin crescentic collection of fluid which may be a small subchorionic  hemorrhage versus physiologic fluid adjacent to the amnion measuring  up to 3.9 x 2 x 0.3 cm.    ABIGAIL SHUKLA MD         SYSTEM ID:  [id number]   Result Value Ref Range    INR 1.05 0.85 - 1.15   Partial thromboplastin time   Result Value Ref Range    aPTT 26 22 - 38 Seconds   Comprehensive metabolic panel   Result Value Ref Range    Sodium 137 135 - 145 mmol/L    Potassium 3.9 3.4 - 5.3 mmol/L    Carbon Dioxide (CO2) 24 22 - 29 mmol/L    Anion Gap 12 7 - 15 mmol/L    Urea Nitrogen 8.3 6.0 - 20.0 mg/dL    Creatinine 0.57 0.51 - 0.95 mg/dL    GFR Estimate >90 >60 mL/min/1.73m2    Calcium 9.6 8.6 - 10.0 mg/dL    Chloride 101 98 - 107 mmol/L    Glucose 82 70 - 99 mg/dL    Alkaline Phosphatase 61 40 - 150 U/L    AST 14 0 - 45 U/L    ALT 11 0 - 50 U/L    Protein Total 7.9 6.4 - 8.3 g/dL    Albumin 4.4 3.5 - 5.2 g/dL    Bilirubin Total 0.2 <=1.2 mg/dL   CBC with platelets and  differential   Result Value Ref Range    WBC Count 9.7 4.0 - 11.0 10e3/uL    RBC Count 4.39 3.80 - 5.20 10e6/uL    Hemoglobin 13.6 11.7 - 15.7 g/dL    Hematocrit 39.6 35.0 - 47.0 %    MCV 90 78 - 100 fL    MCH 31.0 26.5 - 33.0 pg    MCHC 34.3 31.5 - 36.5 g/dL    RDW 11.9 10.0 - 15.0 %    Platelet Count 233 150 - 450 10e3/uL    % Neutrophils 73 %    % Lymphocytes 19 %    % Monocytes 7 %    % Eosinophils 1 %    % Basophils 1 %    % Immature Granulocytes 0 %    NRBCs per 100 WBC 0 <1 /100    Absolute Neutrophils 7.0 1.6 - 8.3 10e3/uL    Absolute Lymphocytes 1.8 0.8 - 5.3 10e3/uL    Absolute Monocytes 0.6 0.0 - 1.3 10e3/uL    Absolute Eosinophils 0.1 0.0 - 0.7 10e3/uL    Absolute Basophils 0.1 0.0 - 0.2 10e3/uL    Absolute Immature Granulocytes 0.0 <=0.4 10e3/uL    Absolute NRBCs 0.0 10e3/uL   Extra Red Top Tube   Result Value Ref Range    Hold Specimen JIC    Extra Green Top (Lithium Heparin) Tube   Result Value Ref Range    Hold Specimen JIC    Result Value Ref Range    Magnesium 2.0 1.7 - 2.3 mg/dL   Adult Type and Screen   Result Value Ref Range    ABO/RH(D) A POS     Antibody Screen Negative Negative    SPECIMEN EXPIRATION DATE [id number]      Lab Results   Component Value Date    ABORH A POS 07/10/2024         Reassessments, Medications, Interventions, & Response to Treatments:  -as above    Medications given during today's ER visit:  Medications   sodium chloride 0.9% BOLUS 1,000 mL (0 mLs Intravenous Stopped 7/10/24 2030)   magnesium sulfate 2 g in 50 mL sterile water intermittent infusion (0 g Intravenous Stopped 7/10/24 2030)       Consultations:  None    Decision Rules, Medical Calculators, and Risk Stratification Tools:  None    MDM Key Documentation Elements for Patient's Evaluation:  Differential diagnosis to include high risk considerations: As above  Escalation to admission/observation considered: Admission/observation considered, but patient does not meet admission criteria  Discussions and  management with other clinicians:    3a. Independent interpretation of testing performed by another health professional:  -No  3b. Discussion of management or test interpretation with another health professional: -No  Independent interpretation of tests:  Ordering and/or review of 3+ test(s)  Discussion of test interpretations with radiology:  No  History obtained from source other than patient or assessment requiring an independent historian:  No  Review of non-ED/external records:  review of 3+ records  Diagnostic tests considered but not ultimately performed/deferred:  -CT abdomen pelvis  Prescription medications considered but not prescribed:  -Opiates, patient was pregnant  Chronic conditions affecting care:  -None  Care affected by social determinants of health:  -None    The patient's management involved:   - Laboratory studies  - Imaging studies  - Parenteral controlled substance      A shared decision making model was used. Time was taken to answer all questions.  Patient and/or associated parties understood and were agreeable to treatment plan.  Plan and all results were discussed. Warning signs and close return precautions to return to the ED given. Copy of results given. Discharged in stable condition. Discharged with discharge instructions outlining plan for further care and follow up.      New prescriptions started at today's ER visit  New Prescriptions    No medications on file       ==================================================================================================================================      Blane CASSIDY PA-C, personally performed the services described in this documentation, and it is both accurate and complete.       Nasir Baxter PA-C  07/10/24 5715

## 2024-07-12 LAB — SCANNED LAB RESULT: NORMAL

## 2024-07-15 ENCOUNTER — PRENATAL OFFICE VISIT (OUTPATIENT)
Dept: OBGYN | Facility: OTHER | Age: 34
End: 2024-07-15
Payer: COMMERCIAL

## 2024-07-15 VITALS
BODY MASS INDEX: 29.04 KG/M2 | SYSTOLIC BLOOD PRESSURE: 118 MMHG | DIASTOLIC BLOOD PRESSURE: 70 MMHG | WEIGHT: 191 LBS | HEART RATE: 67 BPM

## 2024-07-15 DIAGNOSIS — O46.90 VAGINAL BLEEDING IN PREGNANCY: ICD-10-CM

## 2024-07-15 DIAGNOSIS — Z34.92 ENCOUNTER FOR PREGNANCY RELATED EXAMINATION IN SECOND TRIMESTER: Primary | ICD-10-CM

## 2024-07-15 PROCEDURE — 99207 PR OB VISIT-NO CHARGE - GICH ONLY: CPT

## 2024-07-15 ASSESSMENT — PAIN SCALES - GENERAL: PAINLEVEL: NO PAIN (0)

## 2024-07-15 NOTE — NURSING NOTE
"Chief Complaint   Patient presents with    Prenatal Care     13 w 1 d     Pt presents to clinic today for prenatal care 13w 1 d. Pt is still having bleeding and cramping.   Initial /70   Pulse 67   Wt 86.6 kg (191 lb)   LMP 04/14/2024 (Exact Date)   BMI 29.04 kg/m   Estimated body mass index is 29.04 kg/m  as calculated from the following:    Height as of 7/10/24: 1.727 m (5' 8\").    Weight as of this encounter: 86.6 kg (191 lb).  Medication Reconciliation: complete      Kristin Dunn LPN    "

## 2024-07-15 NOTE — PROGRESS NOTES
OB Visit    S: Patient is feeling anxious about bleeding. Known IZAIAH from ED. She is still having some bleeding more light now. Denies LOF or regular Ctx. - FM.    Concerns: as above    O: /70   Pulse 67   Wt 86.6 kg (191 lb)   LMP 2024 (Exact Date)   BMI 29.04 kg/m    Gen: Well-appearing, NAD  FHT: 151, movement seen on bedside US      A/P:  Flaquita Shetty is a 34 year old  at 13w1d by by LMP CW 9 week US, , here for return OB visit.     1..  Obesity at new ob- now resolved: Pre E labs, A1C, ASA 81mg at 12 weeks, growth at 32 weeks, BPPs at 34 weeks   2. HSV: plan suppression  3. Maternal depression: doing okay     PNC: We discussed the below recommendations for planning, testing, and add on options as well as detailed any increased risk based on patient history. The subsequent choices and results if any are reflected below.   Prenatal labs WNL, Rh +, Rubella immune, GCT TBD, 3rd Trimester HGB TBD, GBS TBD   Rhogam: NA  Genetics: low risk XY, low risk carrier  Imaging: dating at 9 weeks, anatomy ordered   Immunizations: TDAP TBD, FLU TBD, RSV TBD  Delivery Plan: TBD   BC after delivery: TBD   Delivery Hx:  X2- [id number]     RTC 4 weeks      Miri PARK, FNP-C  7/15/2024 12:54 PM

## 2024-07-29 ENCOUNTER — PRENATAL OFFICE VISIT (OUTPATIENT)
Dept: OBGYN | Facility: OTHER | Age: 34
End: 2024-07-29
Payer: COMMERCIAL

## 2024-07-29 VITALS
SYSTOLIC BLOOD PRESSURE: 112 MMHG | BODY MASS INDEX: 29.5 KG/M2 | HEART RATE: 74 BPM | DIASTOLIC BLOOD PRESSURE: 64 MMHG | WEIGHT: 194 LBS

## 2024-07-29 DIAGNOSIS — O26.892 PREGNANCY HEADACHE IN SECOND TRIMESTER: Primary | ICD-10-CM

## 2024-07-29 DIAGNOSIS — M54.2 NECK PAIN: ICD-10-CM

## 2024-07-29 DIAGNOSIS — R51.9 PREGNANCY HEADACHE IN SECOND TRIMESTER: Primary | ICD-10-CM

## 2024-07-29 PROCEDURE — 99213 OFFICE O/P EST LOW 20 MIN: CPT

## 2024-07-29 RX ORDER — METOCLOPRAMIDE 10 MG/1
10 TABLET ORAL 3 TIMES DAILY PRN
Qty: 60 TABLET | Refills: 0 | Status: SHIPPED | OUTPATIENT
Start: 2024-07-29

## 2024-07-29 NOTE — PROGRESS NOTES
Follow-Up Visit    S: Ms. Flaquita Shetty is a 34 year old  here for concern for 5-day migraine.  She reports that she gets these frequently.  She has a history of C5-C7 spinal fusion due to bulging disks and asked neck concerns.  She reports that she feels as if this headache is stemming from her neck.  She has a history of utilizing the chiropractor however she has been unable to do so due to insurance coverage.  She notes that she has tried Tylenol at home but nothing else.  She is wondering what else she can try today.  She would be interested in utilizing PT if able.    O:  /64   Pulse 74   Wt 88 kg (194 lb)   LMP 2024 (Exact Date)   BMI 29.50 kg/m    Gen: Well-appearing, NAD  Pulm: nonlabored  Neck: Slightly limited range of motion when turning to right side.  Tenderness with palpation of left side.  Muscles noted to be quite tight.    A/P:  Ms. Flaquita Shetty is a 34 year old  here for 5-day migraine.  Discussed with patient that she should utilize Reglan Benadryl and take a nap at home.  Recommended physical therapy referral to help with tightness of neck.  Discussed neck stretches as well as massage techniques with partner.  Discussed she could utilize ice heat Biofreeze Tylenol and chiropractic care as needed.  She is aware of strict return precautions and when she should present to the clinic or ER.  She voices understanding of these recommendations.  She will follow-up as needed.  Massage techniques demonstrated for partner which patient feels this has already been helpful for her headache.    TREVOR TomP-C  2024 2:48 PM

## 2024-08-12 ENCOUNTER — PRENATAL OFFICE VISIT (OUTPATIENT)
Dept: OBGYN | Facility: OTHER | Age: 34
End: 2024-08-12
Payer: COMMERCIAL

## 2024-08-12 VITALS
WEIGHT: 195.6 LBS | OXYGEN SATURATION: 97 % | RESPIRATION RATE: 16 BRPM | HEART RATE: 74 BPM | TEMPERATURE: 96.4 F | DIASTOLIC BLOOD PRESSURE: 60 MMHG | SYSTOLIC BLOOD PRESSURE: 106 MMHG | BODY MASS INDEX: 29.74 KG/M2

## 2024-08-12 DIAGNOSIS — Z34.92 ENCOUNTER FOR PREGNANCY RELATED EXAMINATION IN SECOND TRIMESTER: Primary | ICD-10-CM

## 2024-08-12 PROCEDURE — 99207 PR OB VISIT-NO CHARGE - GICH ONLY: CPT

## 2024-08-12 ASSESSMENT — PAIN SCALES - GENERAL: PAINLEVEL: NO PAIN (0)

## 2024-08-12 NOTE — NURSING NOTE
"Chief Complaint   Patient presents with    Prenatal Care       Initial /60   Pulse 74   Temp (!) 96.4  F (35.8  C) (Tympanic)   Resp 16   Wt 88.7 kg (195 lb 9.6 oz)   LMP 04/14/2024 (Exact Date)   SpO2 97%   Breastfeeding No   BMI 29.74 kg/m   Estimated body mass index is 29.74 kg/m  as calculated from the following:    Height as of 7/10/24: 1.727 m (5' 8\").    Weight as of this encounter: 88.7 kg (195 lb 9.6 oz).  Medication Reconciliation: complete    Sandra Yu CMA    "

## 2024-08-12 NOTE — PROGRESS NOTES
OB Visit    S: Patient is feeling well. Denies LOF, VB, or regular Ctx. + flutters FM.    Concerns: none    O: /60   Pulse 74   Temp (!) 96.4  F (35.8  C) (Tympanic)   Resp 16   Wt 88.7 kg (195 lb 9.6 oz)   LMP 2024 (Exact Date)   SpO2 97%   Breastfeeding No   BMI 29.74 kg/m    Gen: Well-appearing, NAD  FHT: 144      A/P:  Flaquita Shetty is a 34 year old  at 17w1d by LMP CW 9 week US, , here for return OB visit.     1..  Obesity at new ob- now resolved: Pre E labs, A1C, ASA 81mg at 12 weeks, growth at 32 weeks, BPPs at 34 weeks   2. HSV: plan suppression  3. Maternal depression: doing okay   4. Tension headache: reglan benadryl, chiro, PT as needed      PNC: We discussed the below recommendations for planning, testing, and add on options as well as detailed any increased risk based on patient history. The subsequent choices and results if any are reflected below.   Prenatal labs WNL, Rh +, Rubella immune, GCT TBD, 3rd Trimester HGB TBD, GBS TBD   Rhogam: NA  Genetics: low risk XY, low risk carrier  Imaging: dating at 9 weeks, anatomy ordered   Immunizations: TDAP TBD, FLU TBD, RSV TBD  Delivery Plan: TBD   BC after delivery: TBD   Delivery Hx:  X2- [id number]        RTC 4 weeks with anatomy follow-up       Miri PARK, FNP-C  2024 2:42 PM

## 2024-08-16 ENCOUNTER — TELEPHONE (OUTPATIENT)
Dept: OBGYN | Facility: OTHER | Age: 34
End: 2024-08-16
Payer: COMMERCIAL

## 2024-08-16 DIAGNOSIS — M54.2 NECK PAIN: Primary | ICD-10-CM

## 2024-08-16 NOTE — TELEPHONE ENCOUNTER
Regarding Physical Therapy that you ordered for this patient - Per Rivka ghosh [facility] on 8/15/24 at [phone] at 10:05, no PA is needed.  There is no visit limit, it goes by medical necessity.  However, routine diagnoses, maternity diagnoses, and diagnoses considered a complication related to pregnancy are not covered.  So, this MAY NOT BE COVERED - Call Reference # [id number].    So, is there a non-pregnancy DX that you can order the physical therapy under or can a different method of care be tried?  Tawnya Quigley on 8/16/2024 at 8:44 AM

## 2024-08-16 NOTE — TELEPHONE ENCOUNTER
Please disregard my previous message.  I see that it's already been changed.    Tawnya Quigley on 8/16/2024 at 9:49 AM

## 2024-08-16 NOTE — TELEPHONE ENCOUNTER
Can you please re-order the physical therapy with that DX listed as the primary DX?  Otherwise if it gets billed with the other DX as primary it's less likely to be paid.  The other referral can just be closed then.  Please let me know.    Tawnya Quigley on 8/16/2024 at 9:46 AM

## 2024-08-26 ENCOUNTER — HOSPITAL ENCOUNTER (OUTPATIENT)
Dept: ULTRASOUND IMAGING | Facility: OTHER | Age: 34
Discharge: HOME | End: 2024-08-26
Payer: COMMERCIAL

## 2024-08-26 DIAGNOSIS — Z34.92 ENCOUNTER FOR PREGNANCY RELATED EXAMINATION IN SECOND TRIMESTER: ICD-10-CM

## 2024-08-26 PROCEDURE — 76805 OB US >/= 14 WKS SNGL FETUS: CPT

## 2024-09-09 ENCOUNTER — PRENATAL OFFICE VISIT (OUTPATIENT)
Dept: OBGYN | Facility: OTHER | Age: 34
End: 2024-09-09
Payer: COMMERCIAL

## 2024-09-09 VITALS
BODY MASS INDEX: 30.65 KG/M2 | WEIGHT: 201.6 LBS | DIASTOLIC BLOOD PRESSURE: 68 MMHG | HEART RATE: 82 BPM | OXYGEN SATURATION: 98 % | SYSTOLIC BLOOD PRESSURE: 120 MMHG

## 2024-09-09 DIAGNOSIS — Z34.92 ENCOUNTER FOR PREGNANCY RELATED EXAMINATION IN SECOND TRIMESTER: Primary | ICD-10-CM

## 2024-09-09 PROCEDURE — 99207 PR OB VISIT-NO CHARGE - GICH ONLY: CPT

## 2024-09-09 ASSESSMENT — PAIN SCALES - GENERAL: PAINLEVEL: SEVERE PAIN (6)

## 2024-09-09 NOTE — NURSING NOTE
"Chief Complaint   Patient presents with    Prenatal Care     21w1d     Pt states that she has had a constant headache with no relief from tylenol. Denies vaginal bleeding and leaking of fluid. Baby is moving well.     Initial /68 (BP Location: Right arm, Patient Position: Sitting, Cuff Size: Adult Regular)   Pulse 82   Wt 91.4 kg (201 lb 9.6 oz)   LMP 04/14/2024 (Exact Date)   SpO2 98%   BMI 30.65 kg/m   Estimated body mass index is 30.65 kg/m  as calculated from the following:    Height as of 7/10/24: 1.727 m (5' 8\").    Weight as of this encounter: 91.4 kg (201 lb 9.6 oz).  Medication Reconciliation: complete    Bridget Hauser RN    "

## 2024-09-09 NOTE — PROGRESS NOTES
OB Visit    S: Patient is feeling well. Denies LOF, VB, or regular Ctx. + FM.    Concerns: none    O: /68 (BP Location: Right arm, Patient Position: Sitting, Cuff Size: Adult Regular)   Pulse 82   Wt 91.4 kg (201 lb 9.6 oz)   LMP 2024 (Exact Date)   SpO2 98%   BMI 30.65 kg/m    Gen: Well-appearing, NAD  FHT: 149      A/P:  Flaquita Shetty is a 34 year old  at 21w1d  by LMP CW 9 week US, , here for return OB visit.     1..  Obesity at new ob- now resolved: Pre E labs, A1C, ASA 81mg at 12 weeks, growth at 32 weeks, BPPs at 34 weeks   2. HSV: plan suppression  3. Maternal depression: doing okay   4. Tension headache: reglan benadryl, chiro, PT as needed      PNC: We discussed the below recommendations for planning, testing, and add on options as well as detailed any increased risk based on patient history. The subsequent choices and results if any are reflected below.   Prenatal labs WNL, Rh +, Rubella immune, GCT TBD, 3rd Trimester HGB TBD, GBS TBD   Rhogam: NA  Genetics: low risk XY, low risk carrier  Imaging: dating at 9 weeks, anatomy WNL  Immunizations: TDAP TBD, FLU TBD, RSV TBD  Delivery Plan: TBD   BC after delivery: TBD   Delivery Hx:  X2- [id number]           RTC 4 weeks with GCT       Miri PARK, FNP-C  2024 3:52 PM

## 2024-10-02 ENCOUNTER — THERAPY VISIT (OUTPATIENT)
Dept: PHYSICAL THERAPY | Facility: OTHER | Age: 34
End: 2024-10-02
Payer: COMMERCIAL

## 2024-10-02 DIAGNOSIS — M54.2 NECK PAIN: ICD-10-CM

## 2024-10-02 PROCEDURE — 97140 MANUAL THERAPY 1/> REGIONS: CPT | Mod: GP

## 2024-10-02 PROCEDURE — 97112 NEUROMUSCULAR REEDUCATION: CPT | Mod: GP

## 2024-10-02 PROCEDURE — 97161 PT EVAL LOW COMPLEX 20 MIN: CPT | Mod: GP

## 2024-10-03 PROBLEM — M54.2 NECK PAIN: Status: ACTIVE | Noted: 2024-10-03

## 2024-10-03 NOTE — PROGRESS NOTES
PHYSICAL THERAPY EVALUATION  Type of Visit: Evaluation       Fall Risk Screen:  Fall screen completed by: PT  Have you fallen 2 or more times in the past year?: No  Have you fallen and had an injury in the past year?: No  Is patient a fall risk?: No    Subjective       Presenting condition or subjective complaint:  Patient referred to PT due to chronic headaches (daily), migraines (daily) and neck pain that has progressed in to back pain, left arm pain; history of fusion C5-7 in . Unsure of cause of neck issues, no specific injury in past. Has used chiro in past to keep headaches more controlled but has not been able to be seen due to schedule conflicts as of late. Constant headache with migraines. Also dealing with left SI pain down to left ankle. Patient is currently pregnant with due date in January.   Date of onset: 24    Relevant medical history:     Past Medical History:   Diagnosis Date    Depressive disorder     Genital herpes     Partner also has genital herpes    Postpartum depression     Varicella        Dates & types of surgery:    Past Surgical History:   Procedure Laterality Date    SPINAL FUSION Bilateral 10/01/2021         Prior diagnostic imaging/testing results:     no recent imaging  Prior therapy history for the same diagnosis, illness or injury:    NA    Prior Level of Function  Transfers: Independent  Ambulation: Independent  ADL: Independent    Employment:    teacher      Patient goals for therapy:  reduce headaches, migraines, left arm pain, neck pain, low back and left leg pain    Pain assessment: Pain present  Location: head, neck, low back, left arm and leg/Ratin/10     Objective   CERVICAL SPINE EVALUATION  PAIN: Pain is Exacerbated By: sitting, walking, lifting, carrying, at rest, household tasks, work tasks, self care tasks, driving checking for traffice  INTEGUMENTARY (edema, incisions): WNL  POSTURE:  left shoulder superior to right, left iliac crest superior to  right  GAIT:   Reduced trunk rotation, increased trunk sway    ROM:  rotation left 20 degrees, right 40 degrees  Strength: will continue to assess  DERMATOMES: WNL  NEURAL TENSION:    Left Right                                      Median Positive Positive   Ulnar Positive Positive   Radial  Positive Positive       PALPATION:  Postural loading limited head R/R R/L L/R shoulders L/L L/R R/L; general listening to right head, anterior neck, anterior chest wall, abdomen; local listening to right coronal suture, anterior left rib 3, pharyngobasilar fascia. Cranial screen: listening at vertex short to right. Restricted right coronal suture, restricted sphenoid mobility.  SPINAL SEGMENTAL CONCLUSIONS:  restricted left rotation C1-2      Assessment & Plan   CLINICAL IMPRESSIONS  Medical Diagnosis: M54.2 (ICD-10-CM) - Neck pain    Treatment Diagnosis: headaches, neck pain, back pain, myofascial tightness, muscle weakness   Impression/Assessment: Patient is a 34 year old female with neck pain, headaches/migraines, left arm and leg pain, low back pain complaints.  The following significant findings have been identified: Pain, Decreased ROM/flexibility, Decreased joint mobility, Decreased strength, Impaired muscle performance, Decreased activity tolerance, and Impaired posture. These impairments interfere with their ability to perform self care tasks, work tasks, recreational activities, household chores, driving , household mobility, and community mobility as compared to previous level of function.     Clinical Decision Making (Complexity):  Clinical Presentation: Stable/Uncomplicated  Clinical Presentation Rationale: based on medical and personal factors listed in PT evaluation  Clinical Decision Making (Complexity): Low complexity    PLAN OF CARE  Treatment Interventions:  Modalities: Cryotherapy, E-stim, Hot Pack  Interventions: Manual Therapy, Neuromuscular Re-education, Therapeutic Activity, Therapeutic Exercise,  Self-Care/Home Management    Long Term Goals     PT Goal 1  Goal Identifier: headache frequency  Goal Description: Patient to have reduced headache frequency from daily to 2 times per week, reducing need for pain medication.  Target Date: 04/03/25  PT Goal 2  Goal Identifier: migraine frequency  Goal Description: Patient to report reduced frequency of migraines from daily to 1 time per week.  Target Date: 04/03/25  PT Goal 3  Goal Identifier: left arm  Goal Description: Patient to have 75% reduction in left arm numbness, tingling, burning to greater ease of functional tasks.  Target Date: 04/03/25  PT Goal 4  Goal Identifier: cervical ROM  Goal Description: Patient to have equal cervical rotation for greater ease of checking for traffic while driving, at least 60 degrees bilateral.  Target Date: 04/03/25  PT Goal 5  Goal Identifier: low back pain  Goal Description: Patient to have reduced low back and left leg pain with walking, sitting to less than 3/10.  Target Date: 04/03/25      Frequency of Treatment: 1-2 times per week as needed  Duration of Treatment: 3-6 months due to chronicity of symptoms    Recommended Referrals to Other Professionals:  NA  Education Assessment:   Learner/Method: Patient;No Barriers to Learning    Risks and benefits of evaluation/treatment have been explained.   Patient/Family/caregiver agrees with Plan of Care.     Evaluation Time:     PT Eval, Low Complexity Minutes (11097): 15       Signing Clinician: Harleen King PT

## 2024-10-11 ENCOUNTER — PRENATAL OFFICE VISIT (OUTPATIENT)
Dept: OBGYN | Facility: OTHER | Age: 34
End: 2024-10-11
Payer: COMMERCIAL

## 2024-10-11 VITALS
WEIGHT: 204.9 LBS | DIASTOLIC BLOOD PRESSURE: 78 MMHG | SYSTOLIC BLOOD PRESSURE: 122 MMHG | BODY MASS INDEX: 31.15 KG/M2 | HEART RATE: 70 BPM

## 2024-10-11 DIAGNOSIS — Z34.92 ENCOUNTER FOR PREGNANCY RELATED EXAMINATION IN SECOND TRIMESTER: Primary | ICD-10-CM

## 2024-10-11 LAB
ERYTHROCYTE [DISTWIDTH] IN BLOOD BY AUTOMATED COUNT: 11.9 % (ref 10–15)
GLUCOSE 1H P 50 G GLC PO SERPL-MCNC: 154 MG/DL (ref 70–129)
HCT VFR BLD AUTO: 34.6 % (ref 35–47)
HGB BLD-MCNC: 11.8 G/DL (ref 11.7–15.7)
MCH RBC QN AUTO: 30.7 PG (ref 26.5–33)
MCHC RBC AUTO-ENTMCNC: 34.1 G/DL (ref 31.5–36.5)
MCV RBC AUTO: 90 FL (ref 78–100)
PLATELET # BLD AUTO: 203 10E3/UL (ref 150–450)
RBC # BLD AUTO: 3.84 10E6/UL (ref 3.8–5.2)
WBC # BLD AUTO: 10.5 10E3/UL (ref 4–11)

## 2024-10-11 PROCEDURE — 36415 COLL VENOUS BLD VENIPUNCTURE: CPT | Mod: ZL

## 2024-10-11 PROCEDURE — 99207 PR OB VISIT-NO CHARGE - GICH ONLY: CPT

## 2024-10-11 PROCEDURE — 85018 HEMOGLOBIN: CPT | Mod: ZL

## 2024-10-11 PROCEDURE — 86780 TREPONEMA PALLIDUM: CPT | Mod: ZL

## 2024-10-11 PROCEDURE — 82950 GLUCOSE TEST: CPT | Mod: ZL

## 2024-10-11 ASSESSMENT — PAIN SCALES - GENERAL: PAINLEVEL: NO PAIN (0)

## 2024-10-11 NOTE — PROGRESS NOTES
Pt presents to clinic today for prenatal care 25w5d. Pt denies any bleeding, or leakage of fluid at this time. States baby is moving good. Patient denies contractions.     Leon Hernandez LPN...........................10/11/2024 3:28 PM

## 2024-10-11 NOTE — PROGRESS NOTES
OB Visit    S: Patient is feeling well. Denies LOF, VB, or regular Ctx. + FM.    Concerns: none    O: /78   Pulse 70   Wt 92.9 kg (204 lb 14.4 oz)   LMP 2024 (Exact Date)   BMI 31.15 kg/m    Gen: Well-appearing, NAD  FH: 26.5  FHT: 145      A/P:  Flaquita Shetty is a 34 year old  at 25w5d by LMP CW 9 week US here for return OB visit.     1..  Obesity at new ob- now resolved: Pre E labs, A1C, ASA 81mg at 12 weeks, growth at 28 & 34  weeks, BPPs at 34 weeks   2. HSV: plan suppression  3. Maternal depression: doing okay   4. Tension headache: reglan benadryl, chiro, PT as needed      PNC: We discussed the below recommendations for planning, testing, and add on options as well as detailed any increased risk based on patient history. The subsequent choices and results if any are reflected below.   Prenatal labs WNL, Rh +, Rubella immune, GCT today, 3rd Trimester HGB Today , GBS TBD   Rhogam: NA  Genetics: low risk XY, low risk carrier  Imaging: dating at 9 weeks, anatomy WNL  Immunizations: TDAP TBD, FLU TBD, RSV TBD  Delivery Plan: epidural, breastfeeding   BC after delivery: Tubal after delivery.   Delivery Hx:  X2- pelvis proven to [id number]     RTC 3-4 weeks     Miri PARK, TREVORP-C  10/11/2024 3:42 PM

## 2024-10-12 LAB — T PALLIDUM AB SER QL: NONREACTIVE

## 2024-10-14 DIAGNOSIS — Z34.92 ENCOUNTER FOR PREGNANCY RELATED EXAMINATION IN SECOND TRIMESTER: Primary | ICD-10-CM

## 2024-10-14 NOTE — PROGRESS NOTES
Orders placed for 3 hours glucose test. Routing to scheduling to please call and schedule.     Jadyn Brothers RN on 10/14/2024 at 8:37 AM

## 2024-10-16 ENCOUNTER — THERAPY VISIT (OUTPATIENT)
Dept: PHYSICAL THERAPY | Facility: OTHER | Age: 34
End: 2024-10-16
Payer: COMMERCIAL

## 2024-10-16 DIAGNOSIS — M54.2 NECK PAIN: Primary | ICD-10-CM

## 2024-10-16 PROCEDURE — 97140 MANUAL THERAPY 1/> REGIONS: CPT | Mod: GP

## 2024-10-16 PROCEDURE — 97112 NEUROMUSCULAR REEDUCATION: CPT | Mod: GP

## 2024-10-17 ENCOUNTER — LAB (OUTPATIENT)
Dept: LAB | Facility: OTHER | Age: 34
End: 2024-10-17
Payer: COMMERCIAL

## 2024-10-17 DIAGNOSIS — Z34.92 ENCOUNTER FOR PREGNANCY RELATED EXAMINATION IN SECOND TRIMESTER: ICD-10-CM

## 2024-10-17 LAB
GESTATIONAL GTT 1 HR POST DOSE: 156 MG/DL (ref 60–179)
GESTATIONAL GTT 2 HR POST DOSE: 145 MG/DL (ref 60–154)
GESTATIONAL GTT 3 HR POST DOSE: 108 MG/DL (ref 60–139)
GLUCOSE P FAST SERPL-MCNC: 85 MG/DL (ref 60–94)

## 2024-10-17 PROCEDURE — 82951 GLUCOSE TOLERANCE TEST (GTT): CPT | Mod: ZL

## 2024-10-17 PROCEDURE — 82947 ASSAY GLUCOSE BLOOD QUANT: CPT | Mod: ZL

## 2024-10-17 PROCEDURE — 36415 COLL VENOUS BLD VENIPUNCTURE: CPT | Mod: ZL

## 2024-10-17 PROCEDURE — 82950 GLUCOSE TEST: CPT | Mod: ZL

## 2024-10-23 ENCOUNTER — OFFICE VISIT (OUTPATIENT)
Dept: INTERNAL MEDICINE | Facility: OTHER | Age: 34
End: 2024-10-23
Attending: INTERNAL MEDICINE
Payer: COMMERCIAL

## 2024-10-23 VITALS
RESPIRATION RATE: 16 BRPM | DIASTOLIC BLOOD PRESSURE: 60 MMHG | TEMPERATURE: 97.3 F | BODY MASS INDEX: 30.96 KG/M2 | OXYGEN SATURATION: 99 % | SYSTOLIC BLOOD PRESSURE: 110 MMHG | WEIGHT: 203.6 LBS | HEART RATE: 76 BPM

## 2024-10-23 DIAGNOSIS — J20.9 ACUTE PURULENT BRONCHITIS: Primary | ICD-10-CM

## 2024-10-23 DIAGNOSIS — Z3A.27 27 WEEKS GESTATION OF PREGNANCY: ICD-10-CM

## 2024-10-23 PROCEDURE — 99213 OFFICE O/P EST LOW 20 MIN: CPT | Performed by: INTERNAL MEDICINE

## 2024-10-23 RX ORDER — PREDNISONE 10 MG/1
10 TABLET ORAL DAILY
Qty: 7 TABLET | Refills: 0 | Status: SHIPPED | OUTPATIENT
Start: 2024-10-23 | End: 2024-10-30

## 2024-10-23 RX ORDER — ALBUTEROL SULFATE 90 UG/1
1-2 INHALANT RESPIRATORY (INHALATION) EVERY 4 HOURS PRN
Qty: 18 G | Refills: 0 | Status: SHIPPED | OUTPATIENT
Start: 2024-10-23

## 2024-10-23 RX ORDER — GUAIFENESIN 600 MG/1
1200 TABLET, EXTENDED RELEASE ORAL 2 TIMES DAILY
Qty: 40 TABLET | Refills: 0 | Status: SHIPPED | OUTPATIENT
Start: 2024-10-23 | End: 2024-11-02

## 2024-10-23 RX ORDER — AMOXICILLIN 500 MG/1
1000 CAPSULE ORAL 2 TIMES DAILY
Qty: 28 CAPSULE | Refills: 0 | Status: SHIPPED | OUTPATIENT
Start: 2024-10-23 | End: 2024-10-30

## 2024-10-23 ASSESSMENT — ENCOUNTER SYMPTOMS
COUGH: 1
SHORTNESS OF BREATH: 1
WHEEZING: 1
CHOKING: 0
FATIGUE: 1

## 2024-10-23 ASSESSMENT — PAIN SCALES - GENERAL: PAINLEVEL_OUTOF10: NO PAIN (0)

## 2024-10-23 NOTE — NURSING NOTE
"Chief Complaint   Patient presents with    Cough       Initial /60 (BP Location: Right arm, Patient Position: Sitting, Cuff Size: Adult Regular)   Pulse 76   Temp 97.3  F (36.3  C) (Temporal)   Resp 16   Wt 92.4 kg (203 lb 9.6 oz)   LMP 04/14/2024 (Exact Date)   SpO2 99%   BMI 30.96 kg/m   Estimated body mass index is 30.96 kg/m  as calculated from the following:    Height as of 7/10/24: 1.727 m (5' 8\").    Weight as of this encounter: 92.4 kg (203 lb 9.6 oz).  Medication Review: complete    The next two questions are to help us understand your food security.  If you are feeling you need any assistance in this area, we have resources available to support you today.          5/10/2024   SDOH- Food Insecurity   Within the past 12 months, did you worry that your food would run out before you got money to buy more? N    Within the past 12 months, did the food you bought just not last and you didn t have money to get more? N        Patient-reported         Health Care Directive:  Patient does not have a Health Care Directive: Discussed advance care planning with patient; however, patient declined at this time.    Chasity Ogden LPN      "

## 2024-10-23 NOTE — PATIENT INSTRUCTIONS
Acute purulent bronchitis    START:   - amoxicillin (AMOXIL) 500 MG capsule; Take 2 capsules (1,000 mg) by mouth 2 times daily for 7 days.  - predniSONE (DELTASONE) 10 MG tablet; Take 1 tablet (10 mg) by mouth daily for 7 days.  - albuterol (PROAIR HFA/PROVENTIL HFA/VENTOLIN HFA) 108 (90 Base) MCG/ACT inhaler; Inhale 1-2 puffs into the lungs every 4 hours as needed for shortness of breath, wheezing or cough.  - guaiFENesin (MUCINEX) 600 MG 12 hr tablet; Take 2 tablets (1,200 mg) by mouth 2 times daily for 10 days.    27 weeks gestation of pregnancy    Continue routine pre-danielle follow-up / vitamins.       Return as needed for follow-up for new / worsening symptoms.    Clinic : [phone]  Appointment line: [phone]

## 2024-10-23 NOTE — PROGRESS NOTES
Assessment & Plan     ICD-10-CM    1. Acute purulent bronchitis  J20.9 amoxicillin (AMOXIL) 500 MG capsule     predniSONE (DELTASONE) 10 MG tablet     albuterol (PROAIR HFA/PROVENTIL HFA/VENTOLIN HFA) 108 (90 Base) MCG/ACT inhaler     guaiFENesin (MUCINEX) 600 MG 12 hr tablet      2. 27 weeks gestation of pregnancy  Z3A.27          Patient presents for acute illness.  States that she has been sick since Saturday.  Her lungs feel like they have some burning and they hurt when she breathes.  She has been having wheezing and cough and runny nose, minimal sore throat bilaterally.  Little bit of shortness of breath as well.  Coughing up some yellow phlegm, having some chest and head congestion.    Just lost her grandfather on Monday.    She works as a teacher.  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    Has not checked for COVID.  Advised that she could have viral bronchitis versus bacterial infection.  Given her constellation of symptoms, recommend proceeding with amoxicillin plus low-dose prednisone daily plus albuterol inhaler as needed and some Mucinex to help with thinning the phlegm.  She is happy with plan.  Advise follow-up as needed for new or worsening symptoms.  May need to consider chest x-ray if symptoms do not improve.               Return for follow-up as needed for new or worsening symptoms, Appointments: [phone].      Rigo Whipple MD  [facility] AND \[facility] [place] [facility]\""    Review of Systems   Constitutional:  Positive for fatigue.   Respiratory:  Positive for cough, shortness of breath and wheezing. Negative for choking.    Cardiovascular:  Negative for chest pain.   Genitourinary:         + 27 weeks pregnant   Allergic/Immunologic: Negative for immunocompromised state.       Bambi Sams is a 34 year old, presenting for the following health issues:  Cough        10/23/2024    10:12 AM   Additional Questions   Roomed by Chasity     History of Present Illness       Reason for visit:  Cough  Symptom onset:  3-7  days ago  Symptoms include:  Cough,congestion  Symptom intensity:  Moderate  Symptom progression:  Worsening  Had these symptoms before:  Yes  Has tried/received treatment for these symptoms:  Yes  Previous treatment was successful:  No   She is taking medications regularly.         Acute Illness  Acute illness concerns: What can she take to relieve symptoms  Onset/Duration: 10/19/2024  Symptoms:  Fever: No  Chills/Sweats: YES  Headache (location?): YES  Sinus Pressure: YES  Conjunctivitis:  No  Ear Pain: no  Rhinorrhea: YES  Congestion: YES  Sore Throat: No  Cough: YES  Wheeze: YES  Decreased Appetite: No  Nausea: No  Vomiting: No  Diarrhea: No  Dysuria/Freq.: No  Dysuria or Hematuria: No  Fatigue/Achiness: No  Sick/Strep Exposure: No  Therapies tried and outcome: Tylenol            Objective    /60 (BP Location: Right arm, Patient Position: Sitting, Cuff Size: Adult Regular)   Pulse 76   Temp 97.3  F (36.3  C) (Temporal)   Resp 16   Wt 92.4 kg (203 lb 9.6 oz)   LMP 04/14/2024 (Exact Date)   SpO2 99%   BMI 30.96 kg/m    Body mass index is 30.96 kg/m .  Physical Exam  Constitutional:       Appearance: She is ill-appearing.      Comments: + Mildly ill appearing   Eyes:      General: No scleral icterus.     Conjunctiva/sclera: Conjunctivae normal.   Cardiovascular:      Rate and Rhythm: Normal rate and regular rhythm.   Pulmonary:      Effort: Pulmonary effort is normal.      Breath sounds: Wheezing and rhonchi present.   Abdominal:      Comments: Gravid uterus   Skin:     Findings: No rash.   Neurological:      Mental Status: She is alert.                    Signed Electronically by: Rigo Whipple MD

## 2024-11-01 ENCOUNTER — HOSPITAL ENCOUNTER (OUTPATIENT)
Dept: ULTRASOUND IMAGING | Facility: OTHER | Age: 34
Discharge: HOME | End: 2024-11-01
Payer: COMMERCIAL

## 2024-11-01 DIAGNOSIS — Z34.92 ENCOUNTER FOR PREGNANCY RELATED EXAMINATION IN SECOND TRIMESTER: ICD-10-CM

## 2024-11-01 PROCEDURE — 76816 OB US FOLLOW-UP PER FETUS: CPT

## 2024-11-06 ENCOUNTER — THERAPY VISIT (OUTPATIENT)
Dept: PHYSICAL THERAPY | Facility: OTHER | Age: 34
End: 2024-11-06
Payer: COMMERCIAL

## 2024-11-06 ENCOUNTER — PRENATAL OFFICE VISIT (OUTPATIENT)
Dept: OBGYN | Facility: OTHER | Age: 34
End: 2024-11-06
Payer: COMMERCIAL

## 2024-11-06 VITALS
HEART RATE: 86 BPM | BODY MASS INDEX: 31.66 KG/M2 | SYSTOLIC BLOOD PRESSURE: 114 MMHG | DIASTOLIC BLOOD PRESSURE: 58 MMHG | WEIGHT: 208.2 LBS

## 2024-11-06 DIAGNOSIS — Z34.93 ENCOUNTER FOR PREGNANCY RELATED EXAMINATION IN THIRD TRIMESTER: Primary | ICD-10-CM

## 2024-11-06 DIAGNOSIS — M54.2 NECK PAIN: Primary | ICD-10-CM

## 2024-11-06 DIAGNOSIS — B37.0 THRUSH, ORAL: ICD-10-CM

## 2024-11-06 PROCEDURE — 99213 OFFICE O/P EST LOW 20 MIN: CPT

## 2024-11-06 PROCEDURE — 99207 PR OB VISIT-NO CHARGE - GICH ONLY: CPT

## 2024-11-06 PROCEDURE — 90471 IMMUNIZATION ADMIN: CPT

## 2024-11-06 PROCEDURE — 90715 TDAP VACCINE 7 YRS/> IM: CPT

## 2024-11-06 PROCEDURE — 97112 NEUROMUSCULAR REEDUCATION: CPT | Mod: GP

## 2024-11-06 RX ORDER — MULTIVITAMIN WITH IRON
1 TABLET ORAL DAILY
COMMUNITY

## 2024-11-06 RX ORDER — NYSTATIN 100000 [USP'U]/ML
500000 SUSPENSION ORAL 4 TIMES DAILY
Qty: 473 ML | Refills: 0 | Status: SHIPPED | OUTPATIENT
Start: 2024-11-06

## 2024-11-06 RX ORDER — NYSTATIN 100000 [USP'U]/ML
500000 SUSPENSION ORAL 4 TIMES DAILY
Qty: 473 ML | Refills: 0 | Status: SHIPPED | OUTPATIENT
Start: 2024-11-06 | End: 2024-11-06

## 2024-11-06 ASSESSMENT — PAIN SCALES - GENERAL: PAINLEVEL_OUTOF10: SEVERE PAIN (6)

## 2024-11-06 NOTE — PROGRESS NOTES
OB Visit    S: Patient is feeling poorly.  She reports that she has extreme mouth pain.  All along her gum lines.  She is wondering if she has thrush... Denies LOF, VB, or regular Ctx. + FM.    Concerns: As above    O: /58   Pulse 86   Wt 94.4 kg (208 lb 3.2 oz)   LMP 2024 (Exact Date)   BMI 31.66 kg/m    Gen: Well-appearing, NAD  Mouth: Noted to have erythematous gums with whitened patches.  Tongue does not appear to have whitened patches on it  FH: 32  FHT: 140      A/P:  Flaquita Shetty is a 34 year old  at 29w3d by LMP CW 9 week US here for return OB visit.     1..  Obesity at new ob- now resolved: Pre E labs, A1C, ASA 81mg at 12 weeks, growth at 28 & 34  weeks, BPPs at 34 weeks   2. HSV: plan suppression  3. Maternal depression: doing okay   4. Tension headache: reglan benadryl, chiro, PT as needed   5. S>D- growth scans planned.   6. Oral thrush- nystatin swish and magic mouth wash for pain     PNC: We discussed the below recommendations for planning, testing, and add on options as well as detailed any increased risk based on patient history. The subsequent choices and results if any are reflected below.   Prenatal labs WNL, Rh +, Rubella immune, GCT failed, passed 3 hour, 3rd Trimester HGB 11.8, GBS TBD   Rhogam: NA  Genetics: low risk XY, low risk carrier  Imaging: dating at 9 weeks, anatomy WNL  Immunizations: TDAP 24, FLU declined, RSV TBD  Delivery Plan: epidural, breastfeeding   BC after delivery: Tubal after delivery.   Delivery Hx:  X2- pelvis proven to [id number]     RTC 2 weeks       Miri PARK, TREVORP-C  2024 3:57 PM

## 2024-11-06 NOTE — PROGRESS NOTES
Pt presents to clinic today for prenatal care 29w2d. Pt denies any bleeding, or leakage of fluid at this time. States baby is moving good. Patient denies regular contractions.      Leon Hernandez LPN...........................11/6/2024 3:36 PM

## 2024-11-08 ENCOUNTER — HOSPITAL ENCOUNTER (EMERGENCY)
Facility: OTHER | Age: 34
Discharge: HOME | End: 2024-11-08
Attending: FAMILY MEDICINE
Payer: COMMERCIAL

## 2024-11-08 VITALS
SYSTOLIC BLOOD PRESSURE: 109 MMHG | DIASTOLIC BLOOD PRESSURE: 73 MMHG | HEART RATE: 80 BPM | RESPIRATION RATE: 18 BRPM | TEMPERATURE: 98.5 F | OXYGEN SATURATION: 97 %

## 2024-11-08 DIAGNOSIS — K06.8 PAIN IN GUMS: ICD-10-CM

## 2024-11-08 PROBLEM — J45.909 ASTHMA: Status: ACTIVE | Noted: 2024-11-08

## 2024-11-08 PROBLEM — M47.12 SPONDYLOSIS, CERVICAL, WITH MYELOPATHY: Status: ACTIVE | Noted: 2021-10-29

## 2024-11-08 PROBLEM — F32.A DEPRESSION: Status: ACTIVE | Noted: 2024-11-08

## 2024-11-08 PROBLEM — M48.02 CERVICAL STENOSIS OF SPINAL CANAL: Status: ACTIVE | Noted: 2021-08-25

## 2024-11-08 PROCEDURE — 250N000009 HC RX 250: Performed by: FAMILY MEDICINE

## 2024-11-08 PROCEDURE — 99283 EMERGENCY DEPT VISIT LOW MDM: CPT

## 2024-11-08 RX ORDER — LIDOCAINE HYDROCHLORIDE 20 MG/ML
5 SOLUTION OROPHARYNGEAL ONCE
Status: COMPLETED | OUTPATIENT
Start: 2024-11-08 | End: 2024-11-08

## 2024-11-08 RX ADMIN — LIDOCAINE HYDROCHLORIDE 5 ML: 20 SOLUTION ORAL at 09:40

## 2024-11-08 ASSESSMENT — COLUMBIA-SUICIDE SEVERITY RATING SCALE - C-SSRS
2. HAVE YOU ACTUALLY HAD ANY THOUGHTS OF KILLING YOURSELF IN THE PAST MONTH?: NO
6. HAVE YOU EVER DONE ANYTHING, STARTED TO DO ANYTHING, OR PREPARED TO DO ANYTHING TO END YOUR LIFE?: NO
1. IN THE PAST MONTH, HAVE YOU WISHED YOU WERE DEAD OR WISHED YOU COULD GO TO SLEEP AND NOT WAKE UP?: NO

## 2024-11-08 ASSESSMENT — ACTIVITIES OF DAILY LIVING (ADL): ADLS_ACUITY_SCORE: 0

## 2024-11-08 NOTE — ED NOTES
"Pt called ER and spoke with writer for concerns of driving home, pushing on gum and having \"a bunch of puss coming out.\" Writer put pt on hold, spoke with Dr. Chery and it was recommended that pt follow up with dentist. Writer relayed this information to pt, she stated that she can't call her dentist because they won't take last minute patients after 0700. Writer advised pt call her primary dentist regardless and if she has further questions she can contact nurse triage number for medical advise. Pt verbalized understanding.     Corrine Calderon RN on 11/8/2024 at 11:16 AM    "

## 2024-11-08 NOTE — ED TRIAGE NOTES
Pt arrives via private car with complaints of 10/10 oral pain and swelling. Pt states symptoms started 3 days ago and the swelling and pain have increased. Pt saw her dentist in [place] yesterday and they told her it was due to pregnancy hormones; pt is 30 weeks pregnant. Pt has tried magic mouth wash, nystatin and chlorhexidine but the pain continues to worsen.     Triage Assessment (Adult)       Row Name 11/08/24 0908          Triage Assessment    Airway WDL WDL        Respiratory WDL    Respiratory WDL WDL        Skin Circulation/Temperature WDL    Skin Circulation/Temperature WDL WDL        Cardiac WDL    Cardiac WDL WDL        Peripheral/Neurovascular WDL    Peripheral Neurovascular WDL WDL        Cognitive/Neuro/Behavioral WDL    Cognitive/Neuro/Behavioral WDL WDL

## 2024-11-08 NOTE — ED PROVIDER NOTES
History     Chief Complaint   Patient presents with    Dental Pain    Oral Swelling     HPI  Flaquita Shetty is a 34 year old female who presents for gingival pain and swelling last 3 days with difficulty eating.  She saw her dentist yesterday and was prescribed Magic mouthwash.  It has not been helping significantly.  No fevers or chills.  No tooth pain or specific areas of abscess drainage redness or swelling.  No difficulty swallowing or breathing.  Allergies reviewed.  She is about 30 weeks pregnant.  She had a procedure done at the dental clinic called scaling done yesterday.  This may have aggravated her consider addition.  Discussed with local dentist as well as OB/GYN on-call here.  They recommend continuing plan as able.  Hopefully she will feel better in about 24-48 hours after the procedure.  Avoid NSAIDs at this point in pregnancy.    Allergies:  Allergies   Allergen Reactions    Nitrofurantoin Hives     hives    Sulfa Antibiotics Hives and Swelling     States her lips became swollen. thrush on lips and hives form sulfa    Bactrim [Sulfamethoxazole-Trimethoprim] Hives       Problem List:    Patient Active Problem List    Diagnosis Date Noted    Asthma 11/08/2024     Priority: Medium    Depression 11/08/2024     Priority: Medium    Neck pain 10/03/2024     Priority: Medium    Spondylosis, cervical, with myelopathy 10/29/2021     Priority: Medium    Cervical stenosis of spinal canal 08/25/2021     Priority: Medium    History of herpes genitalis 11/17/2015     Priority: Medium        Past Medical History:    Past Medical History:   Diagnosis Date    Depressive disorder     Genital herpes     Postpartum depression     Varicella        Past Surgical History:    Past Surgical History:   Procedure Laterality Date    SPINAL FUSION Bilateral 10/01/2021       Family History:    Family History   Problem Relation Age of Onset    No Known Problems Mother     No Known Problems Father        Social History:  Marital  Status:  Single [1]  Social History     Tobacco Use    Smoking status: Former     Types: Cigarettes    Smokeless tobacco: Never   Vaping Use    Vaping status: Former    Substances: Nicotine, Flavoring    Devices: Disposable   Substance Use Topics    Alcohol use: Not Currently    Drug use: Never        Medications:    albuterol (PROAIR HFA/PROVENTIL HFA/VENTOLIN HFA) 108 (90 Base) MCG/ACT inhaler  magic mouthwash suspension (diphenhydrAMINE, lidocaine, aluminum-magnesium & simethicone)  magnesium 250 MG tablet  metoclopramide (REGLAN) 10 MG tablet  nystatin (MYCOSTATIN) 142896 UNIT/ML suspension  Prenatal Vit-Fe Fumarate-FA (PRENATAL MULTIVITAMIN  PLUS IRON) 27-1 MG TABS          Review of Systems   HENT:  Positive for dental problem.        Physical Exam   BP: 109/73  Pulse: 80  Temp: 98.5  F (36.9  C)  Resp: 18  SpO2: 97 %      Physical Exam  Constitutional:       Appearance: Normal appearance.   HENT:      Mouth/Throat:      Mouth: Mucous membranes are moist.      Comments: Gingival swelling. No abscesses drainage.   Abdominal:      Comments: gravid   Musculoskeletal:      Cervical back: No rigidity or tenderness.   Lymphadenopathy:      Cervical: No cervical adenopathy.   Neurological:      Mental Status: She is alert.         ED Course        Procedures              Critical Care time:  none            No results found for this or any previous visit (from the past 24 hours).    Medications   lidocaine (viscous) (XYLOCAINE) 2 % solution 5 mL (5 mLs Mouth/Throat $Given 11/8/24 3079)       Assessments & Plan (with Medical Decision Making)     I have reviewed the nursing notes.    I have reviewed the findings, diagnosis, plan and need for follow up with the patient.           Medical Decision Making  The patient's presentation was of straightforward complexity (a clearly self-limited or minor problem).    The patient's evaluation involved:  history and exam without other MDM data elements    The patient's management  necessitated only low risk treatment.        New Prescriptions    No medications on file       Final diagnoses:   Pain in gums   Offered reassurance.  Tylenol at home.  Salt water rinses.  Heat or cold as tolerated.  Magic mouthwash.  All questions answered.  She will follow-up with her OB as scheduled    11/8/2024   [facility] AND [facility]       Merced Chery DO  11/08/24 1013

## 2024-11-11 ENCOUNTER — TELEPHONE (OUTPATIENT)
Dept: OBGYN | Facility: OTHER | Age: 34
End: 2024-11-11
Payer: COMMERCIAL

## 2024-11-11 NOTE — TELEPHONE ENCOUNTER
After verifying pt last name and date of birth, pt stated that she was on amoxicillin for mouth infection and has developed a yeast infection. Pt denies vaginal bleeding and leaking of  fluid and reports baby is moving well/normal.     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 Pt would like one time medication to [facility] pharmacy.     Bridget Hauser RN on 11/11/2024 at 4:12 PM

## 2024-11-12 NOTE — TELEPHONE ENCOUNTER
Please let patient know I reviewed her message.  I would recommend patient be seen and evaluated with vaginal swab to assess for infection, yeast or bacterial vaginosis.  The treatment for these are different.  Especially in pregnancy we do not want her to have to take any medications that would be unnecessary.  Treatment would be over-the-counter 7-day Monistat treatment, and before we asked her to do that for 1 week I would like to be sure we are treating her correctly.

## 2024-11-12 NOTE — TELEPHONE ENCOUNTER
After verifying pts name and date of birth with pt, pt notified of message below and was offered and appointment tomorrow @ 2211 with Kimmie Valentino CNP and accepted.  Little Lucio LPN

## 2024-11-13 ENCOUNTER — PRENATAL OFFICE VISIT (OUTPATIENT)
Dept: OBGYN | Facility: OTHER | Age: 34
End: 2024-11-13
Attending: NURSE PRACTITIONER
Payer: COMMERCIAL

## 2024-11-13 VITALS
SYSTOLIC BLOOD PRESSURE: 118 MMHG | RESPIRATION RATE: 16 BRPM | DIASTOLIC BLOOD PRESSURE: 60 MMHG | TEMPERATURE: 98 F | BODY MASS INDEX: 31.75 KG/M2 | HEART RATE: 65 BPM | WEIGHT: 208.8 LBS | OXYGEN SATURATION: 97 %

## 2024-11-13 DIAGNOSIS — Z34.93 ENCOUNTER FOR PREGNANCY RELATED EXAMINATION IN THIRD TRIMESTER: ICD-10-CM

## 2024-11-13 DIAGNOSIS — N89.8 VAGINAL DISCHARGE: Primary | ICD-10-CM

## 2024-11-13 DIAGNOSIS — B37.31 YEAST INFECTION OF THE VAGINA: ICD-10-CM

## 2024-11-13 DIAGNOSIS — K04.7 DENTAL INFECTION: ICD-10-CM

## 2024-11-13 LAB
BACTERIAL VAGINOSIS VAG-IMP: NEGATIVE
CANDIDA DNA VAG QL NAA+PROBE: DETECTED
CANDIDA GLABRATA / CANDIDA KRUSEI DNA: NOT DETECTED
T VAGINALIS DNA VAG QL NAA+PROBE: NOT DETECTED

## 2024-11-13 PROCEDURE — 0352U MULTIPLEX VAGINAL PANEL BY PCR: CPT | Mod: ZL | Performed by: NURSE PRACTITIONER

## 2024-11-13 RX ORDER — AMOXICILLIN 500 MG/1
CAPSULE ORAL
COMMUNITY
Start: 2024-11-08

## 2024-11-13 RX ORDER — CLOTRIMAZOLE 1 %
1 CREAM WITH APPLICATOR VAGINAL AT BEDTIME
Qty: 1 G | Refills: 0 | Status: SHIPPED | OUTPATIENT
Start: 2024-11-13 | End: 2024-11-20

## 2024-11-13 ASSESSMENT — PAIN SCALES - GENERAL: PAINLEVEL_OUTOF10: NO PAIN (0)

## 2024-11-13 NOTE — NURSING NOTE
"Chief Complaint   Patient presents with    Vaginal Problem     Itching, burning, and dryness        Initial /60   Pulse 65   Temp 98  F (36.7  C) (Tympanic)   Resp 16   Wt 94.7 kg (208 lb 12.8 oz)   LMP 04/14/2024 (Exact Date)   SpO2 97%   Breastfeeding No   BMI 31.75 kg/m   Estimated body mass index is 31.75 kg/m  as calculated from the following:    Height as of 7/10/24: 1.727 m (5' 8\").    Weight as of this encounter: 94.7 kg (208 lb 12.8 oz).  Medication Reconciliation: complete    Sandra Yu CMA    "

## 2024-11-13 NOTE — PROGRESS NOTES
CHIEF COMPLAINT vaginal irritation.    HPI  Flaquita is a 34 year old , who presents to clinic today concerns for vaginal itching, burning and dryness.  Patient is 30w3d gestation.  She feeling normal fetal movement, denies any vaginal bleeding,Denies any contractions.    Patient developed gingival pain swelling and discharge on 2024.  She was seen in the emergency department and followed up with local dentist.  She was started on amoxicillin 3 times a day and has been on this since 2024.  She does feel like her pain in her gums are improving but is still having drainage from her gums.    Starting 3 days ago she developed vaginal itching, burning with dryness.  She has noticed a thick chunky vaginal discharge.  She has not been sexually active.    Patient's last menstrual period was 2024 (exact date).  Pap Smears: Due 2025    I have reviewed the nursing notes.  I have reviewed allergies, medication list, problem list, and past medical history.    OB HISTORY  OB History    Para Term  AB Living   4 2 2 0 1 0   SAB IAB Ectopic Multiple Live Births   1 0 0 0 0      # Outcome Date GA Lbr Deon/2nd Weight Sex Type Anes PTL Lv   4 Current            3 SAB 2024           2 Term 06/10/18 39w1d  3.544 kg (7 lb 13 oz) F Vag-Spont Spinal N       Name: EMMETT IBRAHIM      Apgar1: 8  Apgar5: 9   1 Term 12/19/15 37w1d 18:27 / 03:01 3.118 kg (6 lb 14 oz) M Vag-Spont Spinal        Apgar1: 9  Apgar5: 10     ROS  10-Point ROS negative except as noted in HPI    PHYSICAL EXAM  /60   Pulse 65   Temp 98  F (36.7  C) (Tympanic)   Resp 16   Wt 94.7 kg (208 lb 12.8 oz)   LMP 2024 (Exact Date)   SpO2 97%   Breastfeeding No   BMI 31.75 kg/m    Body mass index is 31.75 kg/m .  Gen: Well-appearing, well developed, non toxic  Dental: Gingival erythema, edema with white pus noted along bottom teeth.  Resp: nonlabored, normal effort, no audible wheezing or cough  GI: soft, nontender,  no flank pain  MS: moving without difficulty  Psych: appropriate mood and affect    Pelvic:  Normal appearing external female genitalia. Normal hair distribution. Vagina is without lesions. Thick white discharge. Cervix normal, no lesions, no cervical motion tenderness.  Chaperone present     FHT: 140-150's    ASSESSMENT/PLAN:  Flaquita Shetty is a 34 year old   here for vaginal symptoms    1. Vaginal discharge (Primary)  - Multiplex Vaginal Panel by PCR  Recommend MVP to rule out infection.  I am concerned for vaginal yeast infection with patient's recent antibiotic use and based on clinical exam findings.  Recommend starting clotrimazole daily at bedtime for the next week.      2. Yeast infection of the vagina  - clotrimazole (LOTRIMIN) 1 % vaginal cream; Place 1 Applicatorful vaginally at bedtime for 7 days.  Dispense: 1 g; Refill: 0    3. Dental infection  Improving.  Patient is afebrile, vital signs are stable.  Urged patient to continue amoxicillin for treatment of gingival infection/abscess.  Encouraged her to contact her dentist tomorrow for an update of her ongoing symptoms as they may want to extend course of antibiotic treatment.    4. Encounter for pregnancy related examination in third trimester  Patient is feeling adequate fetal movement.  No vaginal bleeding.  She will continue following for routine OB visits, next visit is 2024.    Explanation of diagnosis, treatment options and risk and benefits of medications reviewed with patient/caregiver. Patient/caregiver agrees with plan of care. Red flags symptoms, Strict ER precautions and when to return for follow up were discussed and patient/caregiver.    Kimmie Valentino NP  [facility]  OBGYN

## 2024-11-26 ENCOUNTER — PRENATAL OFFICE VISIT (OUTPATIENT)
Dept: OBGYN | Facility: OTHER | Age: 34
End: 2024-11-26
Attending: OBSTETRICS & GYNECOLOGY
Payer: COMMERCIAL

## 2024-11-26 VITALS
HEART RATE: 72 BPM | HEIGHT: 68 IN | SYSTOLIC BLOOD PRESSURE: 116 MMHG | BODY MASS INDEX: 31.57 KG/M2 | WEIGHT: 208.31 LBS | RESPIRATION RATE: 20 BRPM | DIASTOLIC BLOOD PRESSURE: 60 MMHG

## 2024-11-26 DIAGNOSIS — O99.343 DEPRESSION DURING PREGNANCY IN THIRD TRIMESTER: ICD-10-CM

## 2024-11-26 DIAGNOSIS — Z34.83 ENCOUNTER FOR SUPERVISION OF OTHER NORMAL PREGNANCY IN THIRD TRIMESTER: Primary | ICD-10-CM

## 2024-11-26 DIAGNOSIS — F32.A DEPRESSION DURING PREGNANCY IN THIRD TRIMESTER: ICD-10-CM

## 2024-11-26 DIAGNOSIS — Z3A.32 32 WEEKS GESTATION OF PREGNANCY: ICD-10-CM

## 2024-11-26 PROCEDURE — 99207 PR OB VISIT-NO CHARGE - GICH ONLY: CPT | Performed by: OBSTETRICS & GYNECOLOGY

## 2024-11-26 RX ORDER — CHLORHEXIDINE GLUCONATE ORAL RINSE 1.2 MG/ML
SOLUTION DENTAL DAILY PRN
COMMUNITY
Start: 2024-11-07

## 2024-11-26 ASSESSMENT — PAIN SCALES - GENERAL: PAINLEVEL_OUTOF10: MODERATE PAIN (5)

## 2024-11-26 NOTE — NURSING NOTE
Pt presents to clinic today for prenatal care at 32w2d. Pt denies any bleeding, or leakage of fluid at this time. States baby is moving good. Patient has mild contractions that do not last.     Magui Ortega LPN............11/26/2024 4:04 PM

## 2024-11-27 ENCOUNTER — HOSPITAL ENCOUNTER (OUTPATIENT)
Facility: OTHER | Age: 34
Discharge: HOME | End: 2024-11-27
Attending: OBSTETRICS & GYNECOLOGY | Admitting: OBSTETRICS & GYNECOLOGY
Payer: COMMERCIAL

## 2024-11-27 ENCOUNTER — HOSPITAL ENCOUNTER (EMERGENCY)
Facility: OTHER | Age: 34
End: 2024-11-27
Payer: COMMERCIAL

## 2024-11-27 VITALS — TEMPERATURE: 97.9 F | SYSTOLIC BLOOD PRESSURE: 122 MMHG | DIASTOLIC BLOOD PRESSURE: 69 MMHG | RESPIRATION RATE: 16 BRPM

## 2024-11-27 PROBLEM — Z36.89 ENCOUNTER FOR TRIAGE IN PREGNANT PATIENT: Status: ACTIVE | Noted: 2024-11-27

## 2024-11-27 LAB
ALBUMIN UR-MCNC: NEGATIVE MG/DL
APPEARANCE UR: CLEAR
BACTERIAL VAGINOSIS VAG-IMP: POSITIVE
BILIRUB UR QL STRIP: NEGATIVE
CANDIDA DNA VAG QL NAA+PROBE: NOT DETECTED
CANDIDA GLABRATA / CANDIDA KRUSEI DNA: NOT DETECTED
COLOR UR AUTO: ABNORMAL
FFN SPECIMEN INTEGRITY: NORMAL
FIBRONECTIN FETAL VAG QL: NEGATIVE
GLUCOSE UR STRIP-MCNC: NEGATIVE MG/DL
HGB UR QL STRIP: NEGATIVE
KETONES UR STRIP-MCNC: NEGATIVE MG/DL
LEUKOCYTE ESTERASE UR QL STRIP: ABNORMAL
NITRATE UR QL: NEGATIVE
PH UR STRIP: 6 [PH] (ref 5–9)
RBC URINE: 2 /HPF
SP GR UR STRIP: 1.01 (ref 1–1.03)
SQUAMOUS EPITHELIAL: 10 /HPF
T VAGINALIS DNA VAG QL NAA+PROBE: NOT DETECTED
UROBILINOGEN UR STRIP-MCNC: NORMAL MG/DL
WBC URINE: <1 /HPF

## 2024-11-27 PROCEDURE — 82731 ASSAY OF FETAL FIBRONECTIN: CPT | Performed by: OBSTETRICS & GYNECOLOGY

## 2024-11-27 PROCEDURE — 81001 URINALYSIS AUTO W/SCOPE: CPT | Performed by: OBSTETRICS & GYNECOLOGY

## 2024-11-27 PROCEDURE — G0463 HOSPITAL OUTPT CLINIC VISIT: HCPCS

## 2024-11-27 PROCEDURE — 0352U MULTIPLEX VAGINAL PANEL BY PCR: CPT | Performed by: OBSTETRICS & GYNECOLOGY

## 2024-11-27 PROCEDURE — 87086 URINE CULTURE/COLONY COUNT: CPT | Performed by: OBSTETRICS & GYNECOLOGY

## 2024-11-27 RX ORDER — LIDOCAINE 40 MG/G
CREAM TOPICAL
Status: DISCONTINUED | OUTPATIENT
Start: 2024-11-27 | End: 2024-11-28 | Stop reason: HOSPADM

## 2024-11-27 ASSESSMENT — ACTIVITIES OF DAILY LIVING (ADL)
ADLS_ACUITY_SCORE: 41
ADLS_ACUITY_SCORE: 41

## 2024-11-28 NOTE — PROGRESS NOTES
32w3d  patient here from home with c/o contractions every 10 minutes since 1700. Has hx of tooth abscess and yeast infection in the last month. EFM and toco applied. Patient della every 7 minutes, category 1 tracing noted. UA collected and sent to lab. Will notify MD.

## 2024-11-28 NOTE — PROGRESS NOTES
MD notified of patient's arrival and UA results. Orders received for FFN and MVP. Labs collected and sent to lab. SVE FT/soft.

## 2024-11-28 NOTE — PROGRESS NOTES
Patient tested positive for BV. MD aware and sending script to pharmacy. Patient okay to discharge to home. All discharge instructions have been gone over with patient. Patient discharging at this time.

## 2024-11-29 LAB — BACTERIA UR CULT: NORMAL

## 2024-12-02 NOTE — PROGRESS NOTES
" OB Visit    S: Patient is feeling ok. Getting more uncomfortable and noticing more pelvic pressure.    Denies LOF, VB, or regular Ctx. + FM.     O: /60 (BP Location: Right arm, Patient Position: Sitting, Cuff Size: Adult Large)   Pulse 72   Resp 20   Ht 1.727 m (5' 8\")   Wt 94.5 kg (208 lb 5 oz)   LMP 2024 (Exact Date)   BMI 31.67 kg/m    Gen: Well-appearing, NAD  Resp: no increased work of breathing    FH: 34 cm  FHT: 150    A/P:  Flaquita Shetty is a 34 year old  at 32w2d by LMP CW 9 week US here for return OB visit.     1.  Obesity at new ob- now resolved: Pre E labs, A1C, ASA 81mg at 12 weeks, growth at 28 & 34  weeks, BPPs at 34 weeks   2. HSV: plan suppression starting at 36 weeks  3. Maternal depression: doing okay   4. Tension headache: reglan benadryl, chiro, PT as needed   5. S>D- growth scans planned.      Prenatal labs WNL, Rh +, Rubella immune, GCT failed, passed 3 hour, 3rd Trimester HGB 11.8, GBS TBD   Rhogam: NA  Genetics: low risk XY, low risk carrier  Imaging: dating at 9 weeks, anatomy WNL  Immunizations: TDAP 24, FLU declined, RSV TBD  Delivery Plan: epidural, breastfeeding   BC after delivery: Tubal after delivery.   Delivery Hx:  X2- pelvis proven to [id number]     RTC 2 weeks , growth US at that time    Rivka Ordonez MD   "

## 2024-12-10 ENCOUNTER — HOSPITAL ENCOUNTER (OUTPATIENT)
Dept: ULTRASOUND IMAGING | Facility: OTHER | Age: 34
Discharge: HOME | End: 2024-12-10
Payer: COMMERCIAL

## 2024-12-10 DIAGNOSIS — Z34.92 ENCOUNTER FOR PREGNANCY RELATED EXAMINATION IN SECOND TRIMESTER: ICD-10-CM

## 2024-12-10 PROCEDURE — 76816 OB US FOLLOW-UP PER FETUS: CPT

## 2024-12-12 ENCOUNTER — PRENATAL OFFICE VISIT (OUTPATIENT)
Dept: OBGYN | Facility: OTHER | Age: 34
End: 2024-12-12
Payer: COMMERCIAL

## 2024-12-12 VITALS
TEMPERATURE: 96.8 F | WEIGHT: 214.9 LBS | BODY MASS INDEX: 32.68 KG/M2 | SYSTOLIC BLOOD PRESSURE: 118 MMHG | RESPIRATION RATE: 16 BRPM | DIASTOLIC BLOOD PRESSURE: 64 MMHG | HEART RATE: 66 BPM | OXYGEN SATURATION: 99 %

## 2024-12-12 DIAGNOSIS — Z23 NEED FOR VACCINATION: Primary | ICD-10-CM

## 2024-12-12 DIAGNOSIS — O98.519 HERPES SIMPLEX TYPE 2 (HSV-2) INFECTION AFFECTING PREGNANCY, ANTEPARTUM: ICD-10-CM

## 2024-12-12 DIAGNOSIS — B00.9 HERPES SIMPLEX TYPE 2 (HSV-2) INFECTION AFFECTING PREGNANCY, ANTEPARTUM: ICD-10-CM

## 2024-12-12 RX ORDER — VALACYCLOVIR HYDROCHLORIDE 500 MG/1
500 TABLET, FILM COATED ORAL 2 TIMES DAILY
Qty: 60 TABLET | Refills: 2 | Status: SHIPPED | OUTPATIENT
Start: 2024-12-12

## 2024-12-12 ASSESSMENT — PAIN SCALES - GENERAL: PAINLEVEL_OUTOF10: MODERATE PAIN (4)

## 2024-12-12 NOTE — NURSING NOTE
"Chief Complaint   Patient presents with    Prenatal Care     34 weeks 4 days        Initial /64   Pulse 66   Temp 96.8  F (36  C) (Tympanic)   Resp 16   Wt 97.5 kg (214 lb 14.4 oz)   LMP 04/14/2024 (Exact Date)   SpO2 99%   Breastfeeding No   BMI 32.68 kg/m   Estimated body mass index is 32.68 kg/m  as calculated from the following:    Height as of 11/26/24: 1.727 m (5' 8\").    Weight as of this encounter: 97.5 kg (214 lb 14.4 oz).  Medication Reconciliation: complete    Sandra Yu CMA    "

## 2024-12-12 NOTE — PROGRESS NOTES
OB Visit    S: Patient is feeling ok. Getting more uncomfortable and noticing more pelvic pressure.    Denies LOF, VB, or regular Ctx. + FM.     O: /64   Pulse 66   Temp 96.8  F (36  C) (Tympanic)   Resp 16   Wt 97.5 kg (214 lb 14.4 oz)   LMP 2024 (Exact Date)   SpO2 99%   Breastfeeding No   BMI 32.68 kg/m    Gen: Well-appearing, NAD  Resp: no increased work of breathing    FH: 35 cm  FHT: 127    A/P:  Flaquita Shetty is a 34 year old  at 34w4d by LMP CW 9 week US here for return OB visit.     1. Obesity: Pre E labs, A1C, ASA 81mg at 12 weeks, growth at 28 & 34  weeks  2. HSV: plan suppression starting at 36 weeks, Rx sent today  3. Maternal depression: doing okay   4. Tension headache: reglan benadryl, chiro, PT as needed   5. LGA. S>D- growth scans planned. 12/10 US EFW 2820 g, EFW 89%tile, AC > 98%tile     Prenatal labs WNL, Rh +, Rubella immune, GCT failed, passed 3 hour, 3rd Trimester HGB 11.8, GBS TBD   Rhogam: NA  Genetics: low risk XY, low risk carrier  Imaging: dating at 9 weeks, anatomy WNL  Immunizations: TDAP 24, FLU declined, RSV 24  Delivery Plan: epidural, breastfeeding   BC after delivery: Tubal after delivery.   Delivery Hx:  X2- pelvis proven to [id number]     RTC 2 weeks    Rivka Ordonez MD

## 2024-12-26 ENCOUNTER — PRENATAL OFFICE VISIT (OUTPATIENT)
Dept: OBGYN | Facility: OTHER | Age: 34
End: 2024-12-26
Payer: COMMERCIAL

## 2024-12-26 VITALS
DIASTOLIC BLOOD PRESSURE: 68 MMHG | HEART RATE: 68 BPM | SYSTOLIC BLOOD PRESSURE: 132 MMHG | WEIGHT: 218.9 LBS | BODY MASS INDEX: 33.28 KG/M2

## 2024-12-26 DIAGNOSIS — Z34.83 ENCOUNTER FOR SUPERVISION OF OTHER NORMAL PREGNANCY IN THIRD TRIMESTER: Primary | ICD-10-CM

## 2024-12-26 ASSESSMENT — PAIN SCALES - GENERAL: PAINLEVEL_OUTOF10: NO PAIN (0)

## 2024-12-26 NOTE — PROGRESS NOTES
OB Visit    S: Patient is feeling well just sore. Denies LOF, VB, or regular Ctx. + FM.    Concerns: none    O: /68   Pulse 68   Wt 99.3 kg (218 lb 14.4 oz)   LMP 2024 (Exact Date)   BMI 33.28 kg/m    Gen: Well-appearing, NAD  FH: 36.5  FHT: 143  CX: /-3      A/P:  Flaquita Shetty is a 34 year old  at 36w4d by LMP CW 9 week US here for return OB visit.     1..  Obesity at new ob- now resolved: Pre E labs, A1C, ASA 81mg at 12 weeks, growth at 28 & 34  weeks  2. HSV: plan suppression- started suppression   3. Maternal depression: doing okay   4. Tension headache: reglan benadryl, chiro, PT as needed   5. S>D- growth scans 89%ile        PNC: We discussed the below recommendations for planning, testing, and add on options as well as detailed any increased risk based on patient history. The subsequent choices and results if any are reflected below.   Prenatal labs WNL, Rh +, Rubella immune, GCT failed, passed 3 hour, 3rd Trimester HGB 11.8, GBS Today  Rhogam: NA  Genetics: low risk XY, low risk carrier  Imaging: dating at 9 weeks, anatomy WNL  Immunizations: TDAP 24, FLU declined, RSV 24  Delivery Plan: epidural, breastfeeding   BC after delivery: Tubal after delivery.   Delivery Hx:  X2- pelvis proven to [id number]     RTC 1 week      Miri PARK, FNP-C  2024 4:16 PM

## 2024-12-26 NOTE — PROGRESS NOTES
Pt presents to clinic today for prenatal care 36w4d. Pt denies any bleeding, or leakage of fluid at this time. States baby is moving good. Patient denies regular contractions.     Leon Hernandez LPN...........................12/26/2024 3:54 PM

## 2024-12-28 LAB — GP B STREP DNA SPEC QL NAA+PROBE: NEGATIVE

## 2025-01-02 ENCOUNTER — PRENATAL OFFICE VISIT (OUTPATIENT)
Dept: OBGYN | Facility: OTHER | Age: 35
End: 2025-01-02
Payer: COMMERCIAL

## 2025-01-02 VITALS
BODY MASS INDEX: 32.74 KG/M2 | HEART RATE: 68 BPM | SYSTOLIC BLOOD PRESSURE: 122 MMHG | WEIGHT: 215.3 LBS | DIASTOLIC BLOOD PRESSURE: 74 MMHG

## 2025-01-02 DIAGNOSIS — B00.9 HERPES SIMPLEX TYPE 2 (HSV-2) INFECTION AFFECTING PREGNANCY, ANTEPARTUM: ICD-10-CM

## 2025-01-02 DIAGNOSIS — Z34.83 ENCOUNTER FOR SUPERVISION OF OTHER NORMAL PREGNANCY IN THIRD TRIMESTER: Primary | ICD-10-CM

## 2025-01-02 DIAGNOSIS — O98.519 HERPES SIMPLEX TYPE 2 (HSV-2) INFECTION AFFECTING PREGNANCY, ANTEPARTUM: ICD-10-CM

## 2025-01-02 DIAGNOSIS — Z3A.37 37 WEEKS GESTATION OF PREGNANCY: ICD-10-CM

## 2025-01-02 NOTE — NURSING NOTE
Pt presents to clinic today for prenatal care 37w4d. Pt denies any contractions, bleeding, or leakage of fluid at this time. States baby is moving good.      Medication Reconciliation: complete  Little Lucio LPN

## 2025-01-06 ENCOUNTER — HOSPITAL ENCOUNTER (INPATIENT)
Facility: OTHER | Age: 35
LOS: 2 days | Discharge: HOME | End: 2025-01-08
Attending: STUDENT IN AN ORGANIZED HEALTH CARE EDUCATION/TRAINING PROGRAM | Admitting: OBSTETRICS & GYNECOLOGY
Payer: COMMERCIAL

## 2025-01-06 ENCOUNTER — PRENATAL OFFICE VISIT (OUTPATIENT)
Dept: OBGYN | Facility: OTHER | Age: 35
End: 2025-01-06
Attending: OBSTETRICS & GYNECOLOGY
Payer: COMMERCIAL

## 2025-01-06 ENCOUNTER — ANESTHESIA (OUTPATIENT)
Dept: OBGYN | Facility: OTHER | Age: 35
End: 2025-01-06
Payer: COMMERCIAL

## 2025-01-06 ENCOUNTER — ANESTHESIA EVENT (OUTPATIENT)
Dept: OBGYN | Facility: OTHER | Age: 35
End: 2025-01-06
Payer: COMMERCIAL

## 2025-01-06 VITALS
SYSTOLIC BLOOD PRESSURE: 128 MMHG | HEART RATE: 88 BPM | DIASTOLIC BLOOD PRESSURE: 72 MMHG | BODY MASS INDEX: 32.71 KG/M2 | WEIGHT: 215.1 LBS

## 2025-01-06 DIAGNOSIS — Z86.19 HISTORY OF HERPES GENITALIS: ICD-10-CM

## 2025-01-06 DIAGNOSIS — Z90.79 STATUS POST BILATERAL SALPINGECTOMY: Primary | ICD-10-CM

## 2025-01-06 DIAGNOSIS — B00.9 HERPES SIMPLEX TYPE 2 (HSV-2) INFECTION AFFECTING PREGNANCY, ANTEPARTUM: ICD-10-CM

## 2025-01-06 DIAGNOSIS — O98.519 HERPES SIMPLEX TYPE 2 (HSV-2) INFECTION AFFECTING PREGNANCY, ANTEPARTUM: ICD-10-CM

## 2025-01-06 DIAGNOSIS — Z3A.38 38 WEEKS GESTATION OF PREGNANCY: ICD-10-CM

## 2025-01-06 DIAGNOSIS — Z34.83 ENCOUNTER FOR SUPERVISION OF OTHER NORMAL PREGNANCY IN THIRD TRIMESTER: Primary | ICD-10-CM

## 2025-01-06 PROBLEM — Z37.9 NORMAL LABOR: Status: ACTIVE | Noted: 2025-01-06

## 2025-01-06 PROBLEM — Z36.89 ENCOUNTER FOR TRIAGE IN PREGNANT PATIENT: Status: ACTIVE | Noted: 2024-11-27

## 2025-01-06 LAB
ABO + RH BLD: NORMAL
BLD GP AB SCN SERPL QL: NEGATIVE
ERYTHROCYTE [DISTWIDTH] IN BLOOD BY AUTOMATED COUNT: 12.8 % (ref 10–15)
HCT VFR BLD AUTO: 36.2 % (ref 35–47)
HGB BLD-MCNC: 12.4 G/DL (ref 11.7–15.7)
MCH RBC QN AUTO: 29.7 PG (ref 26.5–33)
MCHC RBC AUTO-ENTMCNC: 34.3 G/DL (ref 31.5–36.5)
MCV RBC AUTO: 87 FL (ref 78–100)
PLATELET # BLD AUTO: 205 10E3/UL (ref 150–450)
RBC # BLD AUTO: 4.18 10E6/UL (ref 3.8–5.2)
SPECIMEN EXP DATE BLD: NORMAL
T PALLIDUM AB SER QL: NONREACTIVE
WBC # BLD AUTO: 9.9 10E3/UL (ref 4–11)

## 2025-01-06 PROCEDURE — 86780 TREPONEMA PALLIDUM: CPT | Performed by: STUDENT IN AN ORGANIZED HEALTH CARE EDUCATION/TRAINING PROGRAM

## 2025-01-06 PROCEDURE — 258N000003 HC RX IP 258 OP 636: Performed by: STUDENT IN AN ORGANIZED HEALTH CARE EDUCATION/TRAINING PROGRAM

## 2025-01-06 PROCEDURE — 722N000001 HC LABOR CARE VAGINAL DELIVERY SINGLE

## 2025-01-06 PROCEDURE — 250N000011 HC RX IP 250 OP 636: Performed by: OBSTETRICS & GYNECOLOGY

## 2025-01-06 PROCEDURE — 86900 BLOOD TYPING SEROLOGIC ABO: CPT | Performed by: STUDENT IN AN ORGANIZED HEALTH CARE EDUCATION/TRAINING PROGRAM

## 2025-01-06 PROCEDURE — 250N000011 HC RX IP 250 OP 636: Performed by: NURSE ANESTHETIST, CERTIFIED REGISTERED

## 2025-01-06 PROCEDURE — 10907ZC DRAINAGE OF AMNIOTIC FLUID, THERAPEUTIC FROM PRODUCTS OF CONCEPTION, VIA NATURAL OR ARTIFICIAL OPENING: ICD-10-PCS | Performed by: OBSTETRICS & GYNECOLOGY

## 2025-01-06 PROCEDURE — 370N000003 HC ANESTHESIA WARD SERVICE

## 2025-01-06 PROCEDURE — 85041 AUTOMATED RBC COUNT: CPT | Performed by: STUDENT IN AN ORGANIZED HEALTH CARE EDUCATION/TRAINING PROGRAM

## 2025-01-06 PROCEDURE — 250N000009 HC RX 250: Performed by: OBSTETRICS & GYNECOLOGY

## 2025-01-06 PROCEDURE — 250N000013 HC RX MED GY IP 250 OP 250 PS 637: Performed by: NURSE ANESTHETIST, CERTIFIED REGISTERED

## 2025-01-06 PROCEDURE — 85018 HEMOGLOBIN: CPT | Performed by: STUDENT IN AN ORGANIZED HEALTH CARE EDUCATION/TRAINING PROGRAM

## 2025-01-06 PROCEDURE — 59400 OBSTETRICAL CARE: CPT | Performed by: STUDENT IN AN ORGANIZED HEALTH CARE EDUCATION/TRAINING PROGRAM

## 2025-01-06 PROCEDURE — 250N000009 HC RX 250: Performed by: NURSE ANESTHETIST, CERTIFIED REGISTERED

## 2025-01-06 PROCEDURE — 86901 BLOOD TYPING SEROLOGIC RH(D): CPT | Performed by: STUDENT IN AN ORGANIZED HEALTH CARE EDUCATION/TRAINING PROGRAM

## 2025-01-06 PROCEDURE — 120N000001 HC R&B MED SURG/OB

## 2025-01-06 RX ORDER — NALOXONE HYDROCHLORIDE 0.4 MG/ML
0.4 INJECTION, SOLUTION INTRAMUSCULAR; INTRAVENOUS; SUBCUTANEOUS
Status: DISCONTINUED | OUTPATIENT
Start: 2025-01-06 | End: 2025-01-06

## 2025-01-06 RX ORDER — LIDOCAINE 40 MG/G
CREAM TOPICAL
Status: DISCONTINUED | OUTPATIENT
Start: 2025-01-06 | End: 2025-01-06 | Stop reason: HOSPADM

## 2025-01-06 RX ORDER — METOCLOPRAMIDE 10 MG/1
10 TABLET ORAL EVERY 6 HOURS PRN
Status: DISCONTINUED | OUTPATIENT
Start: 2025-01-06 | End: 2025-01-06 | Stop reason: HOSPADM

## 2025-01-06 RX ORDER — LIDOCAINE HYDROCHLORIDE 10 MG/ML
INJECTION, SOLUTION EPIDURAL; INFILTRATION; INTRACAUDAL; PERINEURAL PRN
Status: DISCONTINUED | OUTPATIENT
Start: 2025-01-06 | End: 2025-01-06

## 2025-01-06 RX ORDER — TRANEXAMIC ACID 10 MG/ML
1 INJECTION, SOLUTION INTRAVENOUS EVERY 30 MIN PRN
Status: DISCONTINUED | OUTPATIENT
Start: 2025-01-06 | End: 2025-01-09 | Stop reason: HOSPADM

## 2025-01-06 RX ORDER — ACETAMINOPHEN 325 MG/1
650 TABLET ORAL EVERY 4 HOURS PRN
Status: DISCONTINUED | OUTPATIENT
Start: 2025-01-06 | End: 2025-01-09 | Stop reason: HOSPADM

## 2025-01-06 RX ORDER — MISOPROSTOL 100 UG/1
400 TABLET ORAL
Status: DISCONTINUED | OUTPATIENT
Start: 2025-01-06 | End: 2025-01-06 | Stop reason: HOSPADM

## 2025-01-06 RX ORDER — NALOXONE HYDROCHLORIDE 0.4 MG/ML
0.2 INJECTION, SOLUTION INTRAMUSCULAR; INTRAVENOUS; SUBCUTANEOUS
Status: DISCONTINUED | OUTPATIENT
Start: 2025-01-06 | End: 2025-01-06 | Stop reason: HOSPADM

## 2025-01-06 RX ORDER — OXYTOCIN/0.9 % SODIUM CHLORIDE 30/500 ML
1-24 PLASTIC BAG, INJECTION (ML) INTRAVENOUS CONTINUOUS
Status: DISCONTINUED | OUTPATIENT
Start: 2025-01-06 | End: 2025-01-06 | Stop reason: HOSPADM

## 2025-01-06 RX ORDER — NALOXONE HYDROCHLORIDE 0.4 MG/ML
0.2 INJECTION, SOLUTION INTRAMUSCULAR; INTRAVENOUS; SUBCUTANEOUS
Status: DISCONTINUED | OUTPATIENT
Start: 2025-01-06 | End: 2025-01-06

## 2025-01-06 RX ORDER — LOPERAMIDE HYDROCHLORIDE 2 MG/1
4 CAPSULE ORAL
Status: DISCONTINUED | OUTPATIENT
Start: 2025-01-06 | End: 2025-01-06 | Stop reason: HOSPADM

## 2025-01-06 RX ORDER — OXYTOCIN/0.9 % SODIUM CHLORIDE 30/500 ML
100-340 PLASTIC BAG, INJECTION (ML) INTRAVENOUS CONTINUOUS PRN
Status: DISCONTINUED | OUTPATIENT
Start: 2025-01-06 | End: 2025-01-06

## 2025-01-06 RX ORDER — IBUPROFEN 400 MG/1
800 TABLET, FILM COATED ORAL EVERY 6 HOURS PRN
Status: DISCONTINUED | OUTPATIENT
Start: 2025-01-06 | End: 2025-01-09 | Stop reason: HOSPADM

## 2025-01-06 RX ORDER — TRANEXAMIC ACID 10 MG/ML
1 INJECTION, SOLUTION INTRAVENOUS EVERY 30 MIN PRN
Status: DISCONTINUED | OUTPATIENT
Start: 2025-01-06 | End: 2025-01-06 | Stop reason: HOSPADM

## 2025-01-06 RX ORDER — ONDANSETRON 4 MG/1
4 TABLET, ORALLY DISINTEGRATING ORAL EVERY 6 HOURS PRN
Status: DISCONTINUED | OUTPATIENT
Start: 2025-01-06 | End: 2025-01-06 | Stop reason: HOSPADM

## 2025-01-06 RX ORDER — METOCLOPRAMIDE HYDROCHLORIDE 5 MG/ML
10 INJECTION INTRAMUSCULAR; INTRAVENOUS EVERY 6 HOURS PRN
Status: DISCONTINUED | OUTPATIENT
Start: 2025-01-06 | End: 2025-01-06 | Stop reason: HOSPADM

## 2025-01-06 RX ORDER — HYDROCORTISONE 25 MG/G
CREAM TOPICAL 3 TIMES DAILY PRN
Status: DISCONTINUED | OUTPATIENT
Start: 2025-01-06 | End: 2025-01-09 | Stop reason: HOSPADM

## 2025-01-06 RX ORDER — SODIUM CHLORIDE, SODIUM LACTATE, POTASSIUM CHLORIDE, CALCIUM CHLORIDE 600; 310; 30; 20 MG/100ML; MG/100ML; MG/100ML; MG/100ML
INJECTION, SOLUTION INTRAVENOUS CONTINUOUS PRN
Status: DISCONTINUED | OUTPATIENT
Start: 2025-01-06 | End: 2025-01-06 | Stop reason: HOSPADM

## 2025-01-06 RX ORDER — LOPERAMIDE HYDROCHLORIDE 2 MG/1
4 CAPSULE ORAL
Status: DISCONTINUED | OUTPATIENT
Start: 2025-01-06 | End: 2025-01-09 | Stop reason: HOSPADM

## 2025-01-06 RX ORDER — KETOROLAC TROMETHAMINE 30 MG/ML
30 INJECTION, SOLUTION INTRAMUSCULAR; INTRAVENOUS
Status: COMPLETED | OUTPATIENT
Start: 2025-01-06 | End: 2025-01-06

## 2025-01-06 RX ORDER — LOPERAMIDE HYDROCHLORIDE 2 MG/1
2 CAPSULE ORAL
Status: DISCONTINUED | OUTPATIENT
Start: 2025-01-06 | End: 2025-01-09 | Stop reason: HOSPADM

## 2025-01-06 RX ORDER — LIDOCAINE HYDROCHLORIDE AND EPINEPHRINE 15; 5 MG/ML; UG/ML
INJECTION, SOLUTION EPIDURAL PRN
Status: DISCONTINUED | OUTPATIENT
Start: 2025-01-06 | End: 2025-01-06

## 2025-01-06 RX ORDER — OXYTOCIN 10 [USP'U]/ML
10 INJECTION, SOLUTION INTRAMUSCULAR; INTRAVENOUS
Status: DISCONTINUED | OUTPATIENT
Start: 2025-01-06 | End: 2025-01-06 | Stop reason: HOSPADM

## 2025-01-06 RX ORDER — PROCHLORPERAZINE MALEATE 10 MG
10 TABLET ORAL EVERY 6 HOURS PRN
Status: DISCONTINUED | OUTPATIENT
Start: 2025-01-06 | End: 2025-01-06 | Stop reason: HOSPADM

## 2025-01-06 RX ORDER — NALOXONE HYDROCHLORIDE 0.4 MG/ML
0.4 INJECTION, SOLUTION INTRAMUSCULAR; INTRAVENOUS; SUBCUTANEOUS
Status: DISCONTINUED | OUTPATIENT
Start: 2025-01-06 | End: 2025-01-06 | Stop reason: HOSPADM

## 2025-01-06 RX ORDER — METHYLERGONOVINE MALEATE 0.2 MG/ML
200 INJECTION INTRAVENOUS
Status: DISCONTINUED | OUTPATIENT
Start: 2025-01-06 | End: 2025-01-09 | Stop reason: HOSPADM

## 2025-01-06 RX ORDER — LOPERAMIDE HYDROCHLORIDE 2 MG/1
2 CAPSULE ORAL
Status: DISCONTINUED | OUTPATIENT
Start: 2025-01-06 | End: 2025-01-06 | Stop reason: HOSPADM

## 2025-01-06 RX ORDER — IBUPROFEN 400 MG/1
800 TABLET, FILM COATED ORAL
Status: COMPLETED | OUTPATIENT
Start: 2025-01-06 | End: 2025-01-06

## 2025-01-06 RX ORDER — CARBOPROST TROMETHAMINE 250 UG/ML
250 INJECTION, SOLUTION INTRAMUSCULAR
Status: DISCONTINUED | OUTPATIENT
Start: 2025-01-06 | End: 2025-01-09 | Stop reason: HOSPADM

## 2025-01-06 RX ORDER — OXYTOCIN/0.9 % SODIUM CHLORIDE 30/500 ML
340 PLASTIC BAG, INJECTION (ML) INTRAVENOUS CONTINUOUS PRN
Status: DISCONTINUED | OUTPATIENT
Start: 2025-01-06 | End: 2025-01-06 | Stop reason: HOSPADM

## 2025-01-06 RX ORDER — OXYTOCIN/0.9 % SODIUM CHLORIDE 30/500 ML
340 PLASTIC BAG, INJECTION (ML) INTRAVENOUS CONTINUOUS PRN
Status: DISCONTINUED | OUTPATIENT
Start: 2025-01-06 | End: 2025-01-09 | Stop reason: HOSPADM

## 2025-01-06 RX ORDER — ACETAMINOPHEN 325 MG/1
650 TABLET ORAL EVERY 4 HOURS PRN
Status: DISCONTINUED | OUTPATIENT
Start: 2025-01-06 | End: 2025-01-06 | Stop reason: HOSPADM

## 2025-01-06 RX ORDER — METHYLERGONOVINE MALEATE 0.2 MG/ML
200 INJECTION INTRAVENOUS
Status: DISCONTINUED | OUTPATIENT
Start: 2025-01-06 | End: 2025-01-06 | Stop reason: HOSPADM

## 2025-01-06 RX ORDER — ONDANSETRON 2 MG/ML
4 INJECTION INTRAMUSCULAR; INTRAVENOUS EVERY 6 HOURS PRN
Status: DISCONTINUED | OUTPATIENT
Start: 2025-01-06 | End: 2025-01-06 | Stop reason: HOSPADM

## 2025-01-06 RX ORDER — FENTANYL CITRATE-0.9 % NACL/PF 10 MCG/ML
100 PLASTIC BAG, INJECTION (ML) INTRAVENOUS EVERY 5 MIN PRN
Status: DISCONTINUED | OUTPATIENT
Start: 2025-01-06 | End: 2025-01-06 | Stop reason: HOSPADM

## 2025-01-06 RX ORDER — FENTANYL/BUPIVACAINE/NS/PF 2-1250MCG
PLASTIC BAG, INJECTION (ML) INJECTION CONTINUOUS PRN
Status: DISCONTINUED | OUTPATIENT
Start: 2025-01-06 | End: 2025-01-06

## 2025-01-06 RX ORDER — MISOPROSTOL 100 UG/1
400 TABLET ORAL
Status: DISCONTINUED | OUTPATIENT
Start: 2025-01-06 | End: 2025-01-09 | Stop reason: HOSPADM

## 2025-01-06 RX ORDER — DOCUSATE SODIUM 100 MG/1
100 CAPSULE, LIQUID FILLED ORAL DAILY
Status: DISCONTINUED | OUTPATIENT
Start: 2025-01-07 | End: 2025-01-09 | Stop reason: HOSPADM

## 2025-01-06 RX ORDER — CITRIC ACID/SODIUM CITRATE 334-500MG
30 SOLUTION, ORAL ORAL
Status: DISCONTINUED | OUTPATIENT
Start: 2025-01-06 | End: 2025-01-06 | Stop reason: HOSPADM

## 2025-01-06 RX ORDER — OXYTOCIN 10 [USP'U]/ML
10 INJECTION, SOLUTION INTRAMUSCULAR; INTRAVENOUS
Status: DISCONTINUED | OUTPATIENT
Start: 2025-01-06 | End: 2025-01-09 | Stop reason: HOSPADM

## 2025-01-06 RX ORDER — BISACODYL 10 MG
10 SUPPOSITORY, RECTAL RECTAL DAILY PRN
Status: DISCONTINUED | OUTPATIENT
Start: 2025-01-06 | End: 2025-01-09 | Stop reason: HOSPADM

## 2025-01-06 RX ORDER — CARBOPROST TROMETHAMINE 250 UG/ML
250 INJECTION, SOLUTION INTRAMUSCULAR
Status: DISCONTINUED | OUTPATIENT
Start: 2025-01-06 | End: 2025-01-06 | Stop reason: HOSPADM

## 2025-01-06 RX ORDER — CITRIC ACID/SODIUM CITRATE 334-500MG
30 SOLUTION, ORAL ORAL ONCE
Status: COMPLETED | OUTPATIENT
Start: 2025-01-06 | End: 2025-01-06

## 2025-01-06 RX ORDER — NALBUPHINE HYDROCHLORIDE 10 MG/ML
2.5-5 INJECTION INTRAMUSCULAR; INTRAVENOUS; SUBCUTANEOUS EVERY 6 HOURS PRN
Status: DISCONTINUED | OUTPATIENT
Start: 2025-01-06 | End: 2025-01-06

## 2025-01-06 RX ORDER — OXYTOCIN 10 [USP'U]/ML
10 INJECTION, SOLUTION INTRAMUSCULAR; INTRAVENOUS
Status: DISCONTINUED | OUTPATIENT
Start: 2025-01-06 | End: 2025-01-06

## 2025-01-06 RX ORDER — MODIFIED LANOLIN
OINTMENT (GRAM) TOPICAL
Status: DISCONTINUED | OUTPATIENT
Start: 2025-01-06 | End: 2025-01-09 | Stop reason: HOSPADM

## 2025-01-06 RX ADMIN — LIDOCAINE HYDROCHLORIDE AND EPINEPHRINE 5 ML: 15; 5 INJECTION, SOLUTION EPIDURAL at 13:37

## 2025-01-06 RX ADMIN — KETOROLAC TROMETHAMINE 30 MG: 30 INJECTION, SOLUTION INTRAMUSCULAR at 18:35

## 2025-01-06 RX ADMIN — Medication 10 ML/HR: at 13:43

## 2025-01-06 RX ADMIN — Medication 2 MILLI-UNITS/MIN: at 16:10

## 2025-01-06 RX ADMIN — SODIUM CHLORIDE, POTASSIUM CHLORIDE, SODIUM LACTATE AND CALCIUM CHLORIDE 1000 ML: 600; 310; 30; 20 INJECTION, SOLUTION INTRAVENOUS at 12:14

## 2025-01-06 RX ADMIN — SODIUM CITRATE AND CITRIC ACID MONOHYDRATE 30 ML: 334; 500 SOLUTION ORAL at 12:53

## 2025-01-06 RX ADMIN — LIDOCAINE HYDROCHLORIDE 3 ML: 10 INJECTION, SOLUTION EPIDURAL; INFILTRATION; INTRACAUDAL; PERINEURAL at 13:30

## 2025-01-06 ASSESSMENT — ACTIVITIES OF DAILY LIVING (ADL)
ADLS_ACUITY_SCORE: 15
ADLS_ACUITY_SCORE: 15
ADLS_ACUITY_SCORE: 22
ADLS_ACUITY_SCORE: 22
DIFFICULTY_EATING/SWALLOWING: NO
ADLS_ACUITY_SCORE: 15
ADLS_ACUITY_SCORE: 41
TOILETING_ISSUES: NO
ADLS_ACUITY_SCORE: 22
ADLS_ACUITY_SCORE: 15
ADLS_ACUITY_SCORE: 15
DOING_ERRANDS_INDEPENDENTLY_DIFFICULTY: NO
WEAR_GLASSES_OR_BLIND: NO
DRESSING/BATHING_DIFFICULTY: NO
ADLS_ACUITY_SCORE: 15
HEARING_DIFFICULTY_OR_DEAF: NO
WALKING_OR_CLIMBING_STAIRS_DIFFICULTY: NO
DIFFICULTY_COMMUNICATING: NO
CONCENTRATING,_REMEMBERING_OR_MAKING_DECISIONS_DIFFICULTY: NO
FALL_HISTORY_WITHIN_LAST_SIX_MONTHS: NO
ADLS_ACUITY_SCORE: 15
ADLS_ACUITY_SCORE: 15

## 2025-01-06 ASSESSMENT — PAIN SCALES - GENERAL: PAINLEVEL_OUTOF10: SEVERE PAIN (6)

## 2025-01-06 NOTE — PROGRESS NOTES
Data: [facility]: 2025 11:40 AM.  Reason for maternal/fetal assessment is uterine contractions. Patient reports contractions have become more intense since membrane sweeping. Patient denies leaking of vaginal fluid/rupture of membranes, vaginal bleeding, pelvic pressure, nausea, vomiting, headache, visual disturbances, epigastric or RUQ pain, significant edema. Patient reports fetal movement is normal. Patient is a 38w1d . Prenatal record reviewed. Pregnancy has been uncomplicated. Support person is present.     Cervix 6 cm dilated. Membranes: intact.    Vital signs wnl. Patient reports pain and is coping.     Action: Verbal consent for EFM. Triage assessment completed. Patient does not meet criteria for early labor discharge.     Response: Patient verbalized agreement with plan. Will contact Dr. Day with update and for further orders.

## 2025-01-06 NOTE — ANESTHESIA PREPROCEDURE EVALUATION
Anesthesia Pre-Procedure Evaluation    Patient: Flaquita Shetty   MRN: [id number] : 1990        Procedure :           Past Medical History:   Diagnosis Date    Depressive disorder     Genital herpes     Partner also has genital herpes    Postpartum depression     Varicella       Past Surgical History:   Procedure Laterality Date    SPINAL FUSION Bilateral 10/01/2021      Allergies   Allergen Reactions    Nitrofurantoin Hives     hives    Sulfa Antibiotics Hives and Swelling     States her lips became swollen. thrush on lips and hives form sulfa    Bactrim [Sulfamethoxazole-Trimethoprim] Hives      Social History     Tobacco Use    Smoking status: Former     Types: Cigarettes    Smokeless tobacco: Never   Substance Use Topics    Alcohol use: Not Currently      Wt Readings from Last 1 Encounters:   25 97.6 kg (215 lb 1.6 oz)        Anesthesia Evaluation   Pt has had prior anesthetic.     No history of anesthetic complications       ROS/MED HX  ENT/Pulmonary:     (+)                     Intermittent, asthma                  Neurologic:  - neg neurologic ROS     Cardiovascular:  - neg cardiovascular ROS     METS/Exercise Tolerance: >4 METS    Hematologic:  - neg hematologic  ROS     Musculoskeletal:  - neg musculoskeletal ROS     GI/Hepatic:     (+) GERD,                   Renal/Genitourinary:  - neg Renal ROS     Endo:  - neg endo ROS     Psychiatric/Substance Use:     (+) psychiatric history depression       Infectious Disease:  - neg infectious disease ROS     Malignancy:  - neg malignancy ROS     Other:      (+) Possibly pregnant, , ,  (-) previous  and TOLAC candidate       Physical Exam    Airway        Mallampati: II   TM distance: > 3 FB   Neck ROM: full   Mouth opening: > 3 cm    Respiratory Devices and Support         Dental  no notable dental history     (+) Minor Abnormalities - some fillings, tiny chips      Cardiovascular   cardiovascular exam normal       Rhythm and rate: regular and  normal     Pulmonary   pulmonary exam normal        breath sounds clear to auscultation           OUTSIDE LABS:  CBC:   Lab Results   Component Value Date    WBC 9.9 01/06/2025    WBC 10.5 10/11/2024    HGB 12.4 01/06/2025    HGB 11.8 10/11/2024    HCT 36.2 01/06/2025    HCT 34.6 (L) 10/11/2024     01/06/2025     10/11/2024     BMP:   Lab Results   Component Value Date     07/10/2024     06/28/2024    POTASSIUM 3.9 07/10/2024    POTASSIUM 3.8 06/28/2024    CHLORIDE 101 07/10/2024    CHLORIDE 104 06/28/2024    CO2 24 07/10/2024    CO2 21 (L) 06/28/2024    BUN 8.3 07/10/2024    BUN 10.4 06/28/2024    CR 0.57 07/10/2024    CR 0.54 06/28/2024    GLC 82 07/10/2024     (H) 06/28/2024     COAGS:   Lab Results   Component Value Date    PTT 26 07/10/2024    INR 1.05 07/10/2024     POC:   Lab Results   Component Value Date    HCG Negative 04/30/2024     HEPATIC:   Lab Results   Component Value Date    ALBUMIN 4.4 07/10/2024    PROTTOTAL 7.9 07/10/2024    ALT 11 07/10/2024    AST 14 07/10/2024    ALKPHOS 61 07/10/2024    BILITOTAL 0.2 07/10/2024     OTHER:   Lab Results   Component Value Date    A1C 4.7 06/28/2024    VICKEY 9.6 07/10/2024    MAG 2.0 07/10/2024       Anesthesia Plan    ASA Status:  2       Anesthesia Type: Epidural.              Consents    Anesthesia Plan(s) and associated risks, benefits, and realistic alternatives discussed. Questions answered and patient/representative(s) expressed understanding.     - Discussed:     - Discussed with:  Patient            Postoperative Care            Comments:           neg OB ROS.      JENSEN CORMIER, APRN CRNA    I have reviewed the pertinent notes and labs in the chart from the past 30 days and (re)examined the patient.  Any updates or changes from those notes are reflected in this note.                             # Asthma: noted on problem list

## 2025-01-06 NOTE — PROGRESS NOTES
Single viable baby boy born via spontaneous vaginal delivery over intact perineum on 2025 @1807. Delivered by DeGrio. Spontaneous respirations, with vigorous cry.   Augmented  Labor at 38.1 weeks gestation.  Mom's GBS status Negative with antibiotic treatment not indicated 4 hours prior to delivery.    Baby placed on mother's abdomen. ID bands applied to baby, mother, and S.O.     Provider notified by by phone. Reported baby's name and sex, date and time of birth, parents choice of DrKaren for baby, gestational age, apgar, mode of delivery, weight in KG, Mother's Group B Strep, and Hepatitus B and HIV status.    Billy Valdovinos RN

## 2025-01-06 NOTE — H&P
[facility]   OB History and Physical   Flaquita Shetty MRN# [id number]   Age: 34 year old YOB: 1990   CC: labor      HPI:   Ms. Flaquita Shetty is a 34 year old  at 38w1d by LMP c/w 9 week US, who presents in labor. She as seen in clinic this morning and was 5.5 cm dilated. Upon recheck 1.5 hours later, she was 6 cm dilated and more uncomfortable. She was sent to L+D for further evaluation.  She continued to get more uncomfortable during contractions and desired an epidural.  Is currently comfortable and denies contraction pain. She denies loss of fluid. + normal fetal movement.      Pregnancy Complications:   - History of genital herpes, asymptomatic. Has been on suppression  - Depression  - Undesired fertility, desires permanent sterilization     OB History   OB History    Para Term  AB Living   4 2 2 0 1 2   SAB IAB Ectopic Multiple Live Births   1 0 0 0 2      # Outcome Date GA Lbr Deon/2nd Weight Sex Type Anes PTL Lv   4 Current            3 SAB 2024           2 Term 06/10/18 39w1d  3.544 kg (7 lb 13 oz) F Vag-Spont Spinal N AMA      Name: PATRICE,GIRL ASHOK WOOD      Apgar1: 8  Apgar5: 9   1 Term 12/19/15 37w1d 18:27 / 03:01 3.118 kg (6 lb 14 oz) M Vag-Spont Spinal  AMA      Apgar1: 9  Apgar5: 10      PMHx:    Past Medical History:   Diagnosis Date    Depressive disorder     Genital herpes     Partner also has genital herpes    Postpartum depression     Varicella       PSHx:    Past Surgical History:   Procedure Laterality Date    SPINAL FUSION Bilateral 10/01/2021      Meds:    Medications Prior to Admission   Medication Sig Dispense Refill Last Dose/Taking    albuterol (PROAIR HFA/PROVENTIL HFA/VENTOLIN HFA) 108 (90 Base) MCG/ACT inhaler Inhale 1-2 puffs into the lungs every 4 hours as needed for shortness of breath, wheezing or cough. 18 g 0 More than a month    magnesium 250 MG tablet Take 1 tablet by mouth daily.   2025 at  9:00 AM    metoclopramide (REGLAN)  10 MG tablet Take 1 tablet (10 mg) by mouth 3 times daily as needed (headache) 60 tablet 0 More than a month    Prenatal Vit-Fe Fumarate-FA (PRENATAL MULTIVITAMIN  PLUS IRON) 27-1 MG TABS Take 1 tablet by mouth daily   1/5/2025 at  9:00 AM    valACYclovir (VALTREX) 500 MG tablet Take 1 tablet (500 mg) by mouth 2 times daily. 60 tablet 2 1/5/2025 at  9:00 AM    chlorhexidine (PERIDEX) 0.12 % solution daily as needed.       nystatin (MYCOSTATIN) 379763 UNIT/ML suspension Take 5 mLs (500,000 Units) by mouth 4 times daily. (Patient not taking: Reported on 1/6/2025) 473 mL 0       Allergies:   Allergies   Allergen Reactions    Nitrofurantoin Hives     hives    Sulfa Antibiotics Hives and Swelling     States her lips became swollen. thrush on lips and hives form sulfa    Bactrim [Sulfamethoxazole-Trimethoprim] Hives      FmHx:   Family History   Problem Relation Age of Onset    No Known Problems Mother     No Known Problems Father       SocHx: She denies any tobacco, alcohol, or other drug use during this pregnancy.   ROS: Complete 10-point ROS negative except as noted in HPI. + tension headache. Denies blurry vision, chest pain, shortness of breath, RUQ pain, nausea, vomiting, dysuria, hematuria or extremity edema.      PE:   Vit: Patient Vitals for the past 4 hrs:   BP Temp Temp src Pulse Resp SpO2   01/06/25 1501 106/57 -- -- -- -- --   01/06/25 1436 -- 97.9  F (36.6  C) -- -- -- --   01/06/25 1431 113/66 -- -- -- -- --   01/06/25 1425 115/63 -- -- -- -- --   01/06/25 1421 112/58 -- -- -- -- 99 %   01/06/25 1416 114/55 -- -- -- -- 100 %   01/06/25 1411 109/58 -- -- -- -- 100 %   01/06/25 1406 103/66 -- -- -- -- 100 %   01/06/25 1400 100/72 -- -- -- -- --   01/06/25 1359 94/66 -- -- -- -- --   01/06/25 1357 116/58 -- -- -- -- --   01/06/25 1355 118/65 -- -- -- -- --   01/06/25 1353 123/66 -- -- -- -- --   01/06/25 1351 106/58 -- -- -- -- 100 %   01/06/25 1349 115/57 -- -- -- -- --   01/06/25 1347 115/63 -- -- -- -- --    25 1340 128/74 -- -- -- -- --   25 1206 118/70 97.9  F (36.6  C) Temporal 80 18 98 %      Gen: Well-appearing, NAD, comfortable   CV: rrr, no mrg   Pulm: Ctab, no wheezes or crackles   Abd: Soft, gravid, non-tender   Ext: trace LE edema b/l   Cx: /-2      Pres: cephalic by vaginal exam   EFW: 8.25 lb by leopolds   Memb: AROM performed without return of fluid      FHT: Baseline 135, moderate variability, + accelerations, intermittent variable decelerations with gabino ~ 105  Waterman: Ctx q 2.5 -4 minutes     Assessment/Plan   Ms. Flaquita Shtety is a 34 year old , at 38w1d by LMP c/w 9 weeks US, who presents in labor.      # Labor  - AROM performed  - Pain controlled with epidural  - Anticipate      # FWB  -  Category 2 FHT, overall reassuring. Continue EFM and toco   - EFW 8.25 lb, pelvic proven to 7 lb 13 oz    # History of HSV  - No lesions on exam, asymptomatic     PNC: Rh +, Rubella immune, GBS negative, , passed GTT, Placenta anterior      Rivka Ordonez MD

## 2025-01-06 NOTE — PROGRESS NOTES
Pt presents to clinic today for prenatal care 38w1d. Pt denies any bleeding, or leakage of fluid at this time. States baby is moving good. Patient denies regular contractions.     Leon Hernandez LPN...........................1/6/2025 9:44 AM

## 2025-01-06 NOTE — ANESTHESIA PROCEDURE NOTES
"Epidural catheter Procedure Note    Pre-Procedure   Staff -        CRNA: Nina Dalton APRN CRNA       Performed By: CRNA       Location: OB       Procedure Start/Stop Times: 1/6/2025 1:26 PM and 1/6/2025 1:40 PM       Pre-Anesthestic Checklist: patient identified, IV checked, risks and benefits discussed, informed consent, monitors and equipment checked, pre-op evaluation, at physician/surgeon's request and post-op pain management  Timeout:       Correct Patient: Yes        Correct Procedure: Yes        Correct Site: Yes        Correct Position: Yes   Procedure Documentation  Procedure: epidural catheter         Diagnosis: Labor Pain       Patient Position: sitting       Patient Prep/Sterile Barriers: sterile gloves, mask, patient draped       Skin prep: Chloraprep       Local skin infiltrated with 3 mL of 1% lidocaine.        Insertion Site: L3-4. (midline approach).       Technique: LORT air        NITO at 6 cm.       Needle Type: Touhy needle       Needle Gauge: 17.        Needle Length (Inches): 3.5        Catheter: 19 G.          Catheter threaded easily.         8 cm epidural space.         Threaded 14 cm at skin.         # of attempts: 1 and  # of redirects:  0    Assessment/Narrative         Paresthesias: No.       Test dose of 5 mL lidocaine 1.5% w/ 1:200,000 epinephrine at 13:37 CST.         Test dose negative, 3 minutes after injection, for signs of intravascular, subdural, or intrathecal injection.       Insertion/Infusion Method: LORT air       Aspiration negative for Heme or CSF via Epidural Catheter.    Medication(s) Administered   Medication Administration Time: 1/6/2025 1:26 PM      FOR [facility] ([facility]) ONLY:   Pain Team Contact information: please page the Pain Team Via [employer]. Search \"Pain\". During daytime hours, please page the attending first. At night please page the resident first.      "

## 2025-01-06 NOTE — PROGRESS NOTES
Data: Patient presented [facility]: 2025 11:40 AM.  Reason for maternal/fetal assessment is uterine contractions and advanced dilation . Patient reports feeling tired and della. Patient denies leaking of vaginal fluid/rupture of membranes. Patient reports fetal movement is normal. Patient is a 38w1d . Prenatal record reviewed. Pregnancy has been complicated by Hx HSV . Support person is present.       Vital signs wnl. Patient reports pain and is coping.     Action: Verbal consent for EFM. Triage assessment completed. Patient does not meet criteria for early labor discharge.     Response: Patient verbalized agreement with plan. Will contact Dr. Day with update and for further orders.

## 2025-01-07 ENCOUNTER — ANESTHESIA EVENT (OUTPATIENT)
Dept: SURGERY | Facility: OTHER | Age: 35
End: 2025-01-07
Payer: COMMERCIAL

## 2025-01-07 DIAGNOSIS — Z30.2 ADMISSION FOR STERILIZATION: Primary | ICD-10-CM

## 2025-01-07 LAB — HGB BLD-MCNC: 11.8 G/DL (ref 11.7–15.7)

## 2025-01-07 PROCEDURE — 85018 HEMOGLOBIN: CPT | Performed by: STUDENT IN AN ORGANIZED HEALTH CARE EDUCATION/TRAINING PROGRAM

## 2025-01-07 PROCEDURE — 250N000009 HC RX 250: Performed by: OBSTETRICS & GYNECOLOGY

## 2025-01-07 PROCEDURE — 36415 COLL VENOUS BLD VENIPUNCTURE: CPT | Performed by: STUDENT IN AN ORGANIZED HEALTH CARE EDUCATION/TRAINING PROGRAM

## 2025-01-07 PROCEDURE — 999N000157 HC STATISTIC RCP TIME EA 10 MIN

## 2025-01-07 PROCEDURE — 250N000013 HC RX MED GY IP 250 OP 250 PS 637: Performed by: STUDENT IN AN ORGANIZED HEALTH CARE EDUCATION/TRAINING PROGRAM

## 2025-01-07 PROCEDURE — 94640 AIRWAY INHALATION TREATMENT: CPT | Mod: 76

## 2025-01-07 PROCEDURE — 94640 AIRWAY INHALATION TREATMENT: CPT

## 2025-01-07 PROCEDURE — 120N000001 HC R&B MED SURG/OB

## 2025-01-07 RX ORDER — ACETAMINOPHEN 325 MG/1
975 TABLET ORAL ONCE
Status: CANCELLED | OUTPATIENT
Start: 2025-01-07 | End: 2025-01-07

## 2025-01-07 RX ORDER — LIDOCAINE 40 MG/G
CREAM TOPICAL
Status: DISCONTINUED | OUTPATIENT
Start: 2025-01-07 | End: 2025-01-08 | Stop reason: HOSPADM

## 2025-01-07 RX ORDER — ALBUTEROL SULFATE 0.83 MG/ML
2.5 SOLUTION RESPIRATORY (INHALATION)
Status: DISCONTINUED | OUTPATIENT
Start: 2025-01-07 | End: 2025-01-09 | Stop reason: HOSPADM

## 2025-01-07 RX ORDER — SODIUM CHLORIDE, SODIUM LACTATE, POTASSIUM CHLORIDE, CALCIUM CHLORIDE 600; 310; 30; 20 MG/100ML; MG/100ML; MG/100ML; MG/100ML
INJECTION, SOLUTION INTRAVENOUS CONTINUOUS
Status: DISCONTINUED | OUTPATIENT
Start: 2025-01-07 | End: 2025-01-07

## 2025-01-07 RX ORDER — SODIUM CHLORIDE, SODIUM LACTATE, POTASSIUM CHLORIDE, CALCIUM CHLORIDE 600; 310; 30; 20 MG/100ML; MG/100ML; MG/100ML; MG/100ML
INJECTION, SOLUTION INTRAVENOUS CONTINUOUS
Status: DISCONTINUED | OUTPATIENT
Start: 2025-01-08 | End: 2025-01-08 | Stop reason: HOSPADM

## 2025-01-07 RX ORDER — SODIUM CHLORIDE, SODIUM LACTATE, POTASSIUM CHLORIDE, CALCIUM CHLORIDE 600; 310; 30; 20 MG/100ML; MG/100ML; MG/100ML; MG/100ML
INJECTION, SOLUTION INTRAVENOUS CONTINUOUS
Status: DISCONTINUED | OUTPATIENT
Start: 2025-01-08 | End: 2025-01-07

## 2025-01-07 RX ADMIN — DOCUSATE SODIUM 100 MG: 100 CAPSULE, LIQUID FILLED ORAL at 11:25

## 2025-01-07 RX ADMIN — ACETAMINOPHEN 650 MG: 325 TABLET, FILM COATED ORAL at 18:07

## 2025-01-07 RX ADMIN — HYDROCORTISONE: 25 CREAM TOPICAL at 11:26

## 2025-01-07 RX ADMIN — IBUPROFEN 800 MG: 400 TABLET, FILM COATED ORAL at 11:25

## 2025-01-07 RX ADMIN — ACETAMINOPHEN 650 MG: 325 TABLET, FILM COATED ORAL at 07:39

## 2025-01-07 RX ADMIN — ALBUTEROL SULFATE 2.5 MG: 2.5 SOLUTION RESPIRATORY (INHALATION) at 15:23

## 2025-01-07 RX ADMIN — ALBUTEROL SULFATE 2.5 MG: 2.5 SOLUTION RESPIRATORY (INHALATION) at 23:22

## 2025-01-07 ASSESSMENT — ACTIVITIES OF DAILY LIVING (ADL)
ADLS_ACUITY_SCORE: 26
ADLS_ACUITY_SCORE: 26
ADLS_ACUITY_SCORE: 22
ADLS_ACUITY_SCORE: 26
ADLS_ACUITY_SCORE: 22
ADLS_ACUITY_SCORE: 26
ADLS_ACUITY_SCORE: 22
ADLS_ACUITY_SCORE: 22
ADLS_ACUITY_SCORE: 26
ADLS_ACUITY_SCORE: 26
ADLS_ACUITY_SCORE: 22
ADLS_ACUITY_SCORE: 22
ADLS_ACUITY_SCORE: 26
ADLS_ACUITY_SCORE: 22

## 2025-01-07 NOTE — PROGRESS NOTES
Requesting Tubal ligation prior to discharged home. Plan for Tubal Ligation arranged per José Luis CASTILLO and on schedule for 1/8/2025. Patient in agreement to this.

## 2025-01-07 NOTE — L&D DELIVERY NOTE
Vaginal Delivery Note    Ms. Shetty presented to labor and delivery with prolonged latent labor. She was found to be 6 cm in the clinic. When she arrived on L&D she stalled her labor course after the epidural.      Her labor was augmented with pitocin. During the second stage of labor she pushed to deliver a baby boy. The baby delivered in a direct OA manner and then restituted in a clockwise manner to face maternal right leg. With gentle downward guidance the anterior, right shoulder was easily delivered. The posterior shoulder quickly followed. He immediately started crying and after back stimulation was vigorously crying. The umbilical cord was then clamped and cut without difficulty by the father. The baby was handed up on to mom's chest.    Active management of the third stage of labor included gentle downward traction on the umbilical cord and suprapubic pressure to ensure no uterine inversion. A small gush of blood signaled placental separation and shortly after that the placenta delivered intact without difficulty. At this time pitocin was started. Uterine fundal massage as well as bimanual exam revealed a firm uterus. A fundal sweep performed revealed some blood clots in the uterine cavity that were manually extracted. There were no remaining placental parts or membranes. Good hemostasis was noted.    An examination of the vaginal mucosa and perineum revealed an intact perineum.  Both Flaquita and her son, Mahendra, tolerated the delivery well and were recovering in the delivery room together.       Kristin Pastrana MD on 1/6/2025 at 6:22 PM

## 2025-01-07 NOTE — PLAN OF CARE
"Pt's VSS. Fundus is firm, at the U, and scant bleeding with no clots. She is breastfeeding every 2-3 hours and on demand. She is ambulating in the room and voiding without difficulty. Pt reports no pain.     Problem: Adult Inpatient Plan of Care  Goal: Plan of Care Review  Description: The Plan of Care Review/Shift note should be completed every shift.  The Outcome Evaluation is a brief statement about your assessment that the patient is improving, declining, or no change.  This information will be displayed automatically on your shift  note.  Outcome: Progressing  Goal: Patient-Specific Goal (Individualized)  Description: You can add care plan individualizations to a care plan. Examples of Individualization might be:  \"Parent requests to be called daily at 9am for status\", \"I have a hard time hearing out of my right ear\", or \"Do not touch me to wake me up as it startles  me\".  Outcome: Progressing  Goal: Absence of Hospital-Acquired Illness or Injury  Outcome: Progressing  Intervention: Prevent Infection  Recent Flowsheet Documentation  Taken 1/7/2025 0030 by Priscilla Garza RN  Infection Prevention:   environmental surveillance performed   hand hygiene promoted   personal protective equipment utilized   rest/sleep promoted   single patient room provided  Goal: Optimal Comfort and Wellbeing  Outcome: Progressing  Intervention: Provide Person-Centered Care  Recent Flowsheet Documentation  Taken 1/7/2025 0030 by Priscilla Garza RN  Trust Relationship/Rapport:   care explained   choices provided   emotional support provided   empathic listening provided   questions answered   questions encouraged   reassurance provided   thoughts/feelings acknowledged  Goal: Readiness for Transition of Care  Outcome: Progressing     Problem: Postpartum (Vaginal Delivery)  Goal: Successful Parent Role Transition  Outcome: Progressing  Goal: Hemostasis  Outcome: Progressing  Goal: Absence of Infection Signs and " Symptoms  Outcome: Progressing  Intervention: Prevent or Manage Infection  Recent Flowsheet Documentation  Taken 1/7/2025 0030 by Priscilla Garza RN  Infection Management: aseptic technique maintained  Goal: Anesthesia/Sedation Recovery  Outcome: Progressing  Goal: Optimal Pain Control and Function  Outcome: Progressing  Intervention: Prevent or Manage Pain  Recent Flowsheet Documentation  Taken 1/6/2025 2030 by Priscilla Garza RN  Perineal Care:   absorbent brief/pad changed   perineum cleansed  Goal: Effective Urinary Elimination  Outcome: Progressing     Problem: Breastfeeding  Goal: Effective Breastfeeding  Outcome: Progressing

## 2025-01-07 NOTE — PLAN OF CARE
Goal Outcome Evaluation: ongoing, progressing    Plan of Care Reviewed With: patient, significant other    Overall Patient Progress: improving    Outcome Evaluation: VSS, FF & 1 below U    Scant lochia, no clots. Patient reports she is doing well. Has some respiratory concerns - see previous notes. +2 dependent edema, +1 generalized edema. PIV saline locked. Keep in for tubal ligation tomorrow. Caring for  independently. Nipples mildly tender - using lanolin cream. Educated on other forms of pain relief for nipples. Positive bonding behaviors noted between  and parents. Significant other at bedside and is supportive.    Temp: 97.5  F (36.4  C) Temp src: Temporal BP: 119/68 Pulse: 76   Resp: 18 SpO2: 98 % O2 Device: None (Room air)      Billy Valdovinos RN on 2025 at 3:10 PM

## 2025-01-07 NOTE — PROGRESS NOTES
"Notified Dr. Ordonez of patient's complaints of a cough, \"feeling like there's fluid in my lungs\" and intermittent SOB. Clear lung sounds, HRR, no murmur auscultated. Orders for PRN nebulizer treatment.    Billy Valdovinos RN    "

## 2025-01-07 NOTE — ANESTHESIA POSTPROCEDURE EVALUATION
Patient: Flaquita Shetty    Procedure: * No procedures listed *       Anesthesia Type:  Epidural    Note:  Disposition: Inpatient   Postop Pain Control: Uneventful            Sign Out: Well controlled pain   PONV: No   Neuro/Psych: Uneventful            Sign Out: Acceptable/Baseline neuro status   Airway/Respiratory: Uneventful            Sign Out: Acceptable/Baseline resp. status   CV/Hemodynamics: Uneventful            Sign Out: Acceptable CV status; No obvious hypovolemia; No obvious fluid overload   Other NRE: NONE   DID A NON-ROUTINE EVENT OCCUR? No    Event details/Postop Comments:  Patient was happy with her epidural.  She has no residual numbness or tingling in her lower extremities.  She has voided.  She reports no fever or chills.            Last vitals:  Vitals:    01/07/25 0005 01/07/25 0400 01/07/25 0837   BP: 116/56 106/58 116/58   Pulse:   71   Resp:   16   Temp:  97.2  F (36.2  C) 97.9  F (36.6  C)   SpO2:   97%       Electronically Signed By: VIVIAN NARVAEZ CRNA  January 7, 2025  1:28 PM

## 2025-01-07 NOTE — PROGRESS NOTES
Updated patient on nebulizer treatment and surgery time of 1220. Notified patient NPO starts at 0300 and clear liquids 2 hours prior to surgery start time    Billy Valdovinos RN

## 2025-01-07 NOTE — PROGRESS NOTES
Hemroid   x 1, soft and pink, Educated patient on how to care for and questions answered. Hydrocortisone, tucks pads at bedside. Encouraged tub soak.

## 2025-01-07 NOTE — PROGRESS NOTES
[facility] Obstetrics Postpartum Progress Note    S: Flaquita is doing and feeling well this morning.  Lochia = menses, no clots. Is having some cramping while nursing. Breast feeding is going well, did try to supplement with formula which baby did not like.  Voiding spontaneously, no BM yet. Would like tubal sterilization.     O:  Vitals:    25 0004 25 0005 25 0400 25 0837   BP:  116/56 106/58 116/58   BP Location:  Right arm  Right arm   Patient Position:  Semi-Lloyd's     Cuff Size:  Adult Regular  Adult Regular   Pulse:    71   Resp: 16   16   Temp: 97.5  F (36.4  C)  97.2  F (36.2  C) 97.9  F (36.6  C)   TempSrc: Temporal   Temporal   SpO2: 95%   97%     Gen: Resting quietly, NAD   Resp: nonlabored breathing  Abd: soft, nondistended, appropriately TTP, FF at U-2  Ext: non-tender, trace edema, no erythema    Hemoglobin   Date Value Ref Range Status   2025 11.8 11.7 - 15.7 g/dL Final   2025 12.4 11.7 - 15.7 g/dL Final       A: 34 year old  PPD#1 s/p    P:   1. Routine postpartum cares.    2. Heme: 12.4 >  mL > 11.8  3. Rh+, RI  4. Infant: Doing well, feeding via breast  5. Birth control: Desires sterilization. OR is unable to add her on today, will plan for surgery tomorrow.        Rivka Ordonez MD     Anticipate discharge home tomorrow

## 2025-01-08 ENCOUNTER — ANESTHESIA (OUTPATIENT)
Dept: SURGERY | Facility: OTHER | Age: 35
End: 2025-01-08
Payer: COMMERCIAL

## 2025-01-08 VITALS
BODY MASS INDEX: 32.26 KG/M2 | TEMPERATURE: 97.7 F | RESPIRATION RATE: 16 BRPM | DIASTOLIC BLOOD PRESSURE: 60 MMHG | WEIGHT: 212.2 LBS | OXYGEN SATURATION: 100 % | HEART RATE: 62 BPM | SYSTOLIC BLOOD PRESSURE: 130 MMHG

## 2025-01-08 LAB — GLUCOSE BLDC GLUCOMTR-MCNC: 79 MG/DL (ref 70–99)

## 2025-01-08 PROCEDURE — 250N000009 HC RX 250: Performed by: OBSTETRICS & GYNECOLOGY

## 2025-01-08 PROCEDURE — 370N000017 HC ANESTHESIA TECHNICAL FEE, PER MIN: Performed by: OBSTETRICS & GYNECOLOGY

## 2025-01-08 PROCEDURE — 999N000157 HC STATISTIC RCP TIME EA 10 MIN

## 2025-01-08 PROCEDURE — 250N000013 HC RX MED GY IP 250 OP 250 PS 637: Performed by: STUDENT IN AN ORGANIZED HEALTH CARE EDUCATION/TRAINING PROGRAM

## 2025-01-08 PROCEDURE — 360N000075 HC SURGERY LEVEL 2, PER MIN: Performed by: OBSTETRICS & GYNECOLOGY

## 2025-01-08 PROCEDURE — 272N000002 HC OR SUPPLY OTHER OPNP: Performed by: OBSTETRICS & GYNECOLOGY

## 2025-01-08 PROCEDURE — 250N000009 HC RX 250: Performed by: NURSE ANESTHETIST, CERTIFIED REGISTERED

## 2025-01-08 PROCEDURE — 250N000026 HC DESFLURANE, PER MIN: Performed by: OBSTETRICS & GYNECOLOGY

## 2025-01-08 PROCEDURE — 258N000003 HC RX IP 258 OP 636: Performed by: STUDENT IN AN ORGANIZED HEALTH CARE EDUCATION/TRAINING PROGRAM

## 2025-01-08 PROCEDURE — 272N000001 HC OR GENERAL SUPPLY STERILE: Performed by: OBSTETRICS & GYNECOLOGY

## 2025-01-08 PROCEDURE — 0UT70ZZ RESECTION OF BILATERAL FALLOPIAN TUBES, OPEN APPROACH: ICD-10-PCS | Performed by: OBSTETRICS & GYNECOLOGY

## 2025-01-08 PROCEDURE — 710N000010 HC RECOVERY PHASE 1, LEVEL 2, PER MIN: Performed by: OBSTETRICS & GYNECOLOGY

## 2025-01-08 PROCEDURE — 250N000011 HC RX IP 250 OP 636: Performed by: NURSE ANESTHETIST, CERTIFIED REGISTERED

## 2025-01-08 PROCEDURE — 258N000003 HC RX IP 258 OP 636: Performed by: NURSE ANESTHETIST, CERTIFIED REGISTERED

## 2025-01-08 PROCEDURE — 94640 AIRWAY INHALATION TREATMENT: CPT | Mod: 76

## 2025-01-08 PROCEDURE — 88302 TISSUE EXAM BY PATHOLOGIST: CPT

## 2025-01-08 PROCEDURE — 250N000011 HC RX IP 250 OP 636: Performed by: OBSTETRICS & GYNECOLOGY

## 2025-01-08 RX ORDER — KETOROLAC TROMETHAMINE 30 MG/ML
INJECTION, SOLUTION INTRAMUSCULAR; INTRAVENOUS PRN
Status: DISCONTINUED | OUTPATIENT
Start: 2025-01-08 | End: 2025-01-08

## 2025-01-08 RX ORDER — ACETAMINOPHEN 325 MG/1
975 TABLET ORAL ONCE
Status: DISCONTINUED | OUTPATIENT
Start: 2025-01-08 | End: 2025-01-08 | Stop reason: HOSPADM

## 2025-01-08 RX ORDER — HYDROMORPHONE HCL IN WATER/PF 6 MG/30 ML
0.4 PATIENT CONTROLLED ANALGESIA SYRINGE INTRAVENOUS EVERY 5 MIN PRN
Status: DISCONTINUED | OUTPATIENT
Start: 2025-01-08 | End: 2025-01-08

## 2025-01-08 RX ORDER — SODIUM CHLORIDE, SODIUM LACTATE, POTASSIUM CHLORIDE, CALCIUM CHLORIDE 600; 310; 30; 20 MG/100ML; MG/100ML; MG/100ML; MG/100ML
INJECTION, SOLUTION INTRAVENOUS CONTINUOUS PRN
Status: DISCONTINUED | OUTPATIENT
Start: 2025-01-08 | End: 2025-01-08

## 2025-01-08 RX ORDER — ONDANSETRON 4 MG/1
4 TABLET, ORALLY DISINTEGRATING ORAL EVERY 30 MIN PRN
Status: DISCONTINUED | OUTPATIENT
Start: 2025-01-08 | End: 2025-01-08

## 2025-01-08 RX ORDER — ONDANSETRON 2 MG/ML
4 INJECTION INTRAMUSCULAR; INTRAVENOUS EVERY 30 MIN PRN
Status: DISCONTINUED | OUTPATIENT
Start: 2025-01-08 | End: 2025-01-08

## 2025-01-08 RX ORDER — OXYCODONE HYDROCHLORIDE 5 MG/1
10 TABLET ORAL
Status: DISCONTINUED | OUTPATIENT
Start: 2025-01-08 | End: 2025-01-08

## 2025-01-08 RX ORDER — BUPIVACAINE HYDROCHLORIDE 2.5 MG/ML
INJECTION, SOLUTION INFILTRATION; PERINEURAL PRN
Status: DISCONTINUED | OUTPATIENT
Start: 2025-01-08 | End: 2025-01-08 | Stop reason: HOSPADM

## 2025-01-08 RX ORDER — HYDROMORPHONE HCL IN WATER/PF 6 MG/30 ML
0.2 PATIENT CONTROLLED ANALGESIA SYRINGE INTRAVENOUS EVERY 5 MIN PRN
Status: DISCONTINUED | OUTPATIENT
Start: 2025-01-08 | End: 2025-01-08

## 2025-01-08 RX ORDER — DEXAMETHASONE SODIUM PHOSPHATE 4 MG/ML
INJECTION, SOLUTION INTRA-ARTICULAR; INTRALESIONAL; INTRAMUSCULAR; INTRAVENOUS; SOFT TISSUE PRN
Status: DISCONTINUED | OUTPATIENT
Start: 2025-01-08 | End: 2025-01-08

## 2025-01-08 RX ORDER — DEXAMETHASONE SODIUM PHOSPHATE 10 MG/ML
4 INJECTION, SOLUTION INTRAMUSCULAR; INTRAVENOUS
Status: DISCONTINUED | OUTPATIENT
Start: 2025-01-08 | End: 2025-01-08

## 2025-01-08 RX ORDER — SODIUM CHLORIDE, SODIUM LACTATE, POTASSIUM CHLORIDE, CALCIUM CHLORIDE 600; 310; 30; 20 MG/100ML; MG/100ML; MG/100ML; MG/100ML
INJECTION, SOLUTION INTRAVENOUS CONTINUOUS
Status: DISCONTINUED | OUTPATIENT
Start: 2025-01-08 | End: 2025-01-08

## 2025-01-08 RX ORDER — LIDOCAINE HYDROCHLORIDE 20 MG/ML
INJECTION, SOLUTION INFILTRATION; PERINEURAL PRN
Status: DISCONTINUED | OUTPATIENT
Start: 2025-01-08 | End: 2025-01-08

## 2025-01-08 RX ORDER — FENTANYL CITRATE 50 UG/ML
50 INJECTION, SOLUTION INTRAMUSCULAR; INTRAVENOUS
Status: DISCONTINUED | OUTPATIENT
Start: 2025-01-08 | End: 2025-01-08

## 2025-01-08 RX ORDER — NALOXONE HYDROCHLORIDE 0.4 MG/ML
0.1 INJECTION, SOLUTION INTRAMUSCULAR; INTRAVENOUS; SUBCUTANEOUS
Status: DISCONTINUED | OUTPATIENT
Start: 2025-01-08 | End: 2025-01-08

## 2025-01-08 RX ORDER — PROPOFOL 10 MG/ML
INJECTION, EMULSION INTRAVENOUS PRN
Status: DISCONTINUED | OUTPATIENT
Start: 2025-01-08 | End: 2025-01-08

## 2025-01-08 RX ORDER — FENTANYL CITRATE 50 UG/ML
25 INJECTION, SOLUTION INTRAMUSCULAR; INTRAVENOUS EVERY 5 MIN PRN
Status: DISCONTINUED | OUTPATIENT
Start: 2025-01-08 | End: 2025-01-08

## 2025-01-08 RX ORDER — FENTANYL CITRATE 50 UG/ML
50 INJECTION, SOLUTION INTRAMUSCULAR; INTRAVENOUS EVERY 5 MIN PRN
Status: DISCONTINUED | OUTPATIENT
Start: 2025-01-08 | End: 2025-01-08

## 2025-01-08 RX ORDER — FENTANYL CITRATE 50 UG/ML
INJECTION, SOLUTION INTRAMUSCULAR; INTRAVENOUS PRN
Status: DISCONTINUED | OUTPATIENT
Start: 2025-01-08 | End: 2025-01-08

## 2025-01-08 RX ORDER — OXYCODONE HYDROCHLORIDE 5 MG/1
5 TABLET ORAL
Status: DISCONTINUED | OUTPATIENT
Start: 2025-01-08 | End: 2025-01-08

## 2025-01-08 RX ORDER — ONDANSETRON 2 MG/ML
INJECTION INTRAMUSCULAR; INTRAVENOUS PRN
Status: DISCONTINUED | OUTPATIENT
Start: 2025-01-08 | End: 2025-01-08

## 2025-01-08 RX ORDER — OXYCODONE HYDROCHLORIDE 5 MG/1
5 TABLET ORAL EVERY 6 HOURS PRN
Qty: 10 TABLET | Refills: 0 | Status: SHIPPED | OUTPATIENT
Start: 2025-01-08 | End: 2025-01-11

## 2025-01-08 RX ADMIN — FENTANYL CITRATE 50 MCG: 50 INJECTION INTRAMUSCULAR; INTRAVENOUS at 17:13

## 2025-01-08 RX ADMIN — PROPOFOL 150 MG: 10 INJECTION, EMULSION INTRAVENOUS at 16:03

## 2025-01-08 RX ADMIN — FENTANYL CITRATE 50 MCG: 50 INJECTION INTRAMUSCULAR; INTRAVENOUS at 16:23

## 2025-01-08 RX ADMIN — LIDOCAINE HYDROCHLORIDE 40 MG: 20 INJECTION, SOLUTION INFILTRATION; PERINEURAL at 16:03

## 2025-01-08 RX ADMIN — PROPOFOL 20 MG: 10 INJECTION, EMULSION INTRAVENOUS at 16:23

## 2025-01-08 RX ADMIN — IBUPROFEN 800 MG: 400 TABLET, FILM COATED ORAL at 01:06

## 2025-01-08 RX ADMIN — FENTANYL CITRATE 50 MCG: 50 INJECTION INTRAMUSCULAR; INTRAVENOUS at 16:01

## 2025-01-08 RX ADMIN — DEXAMETHASONE SODIUM PHOSPHATE 4 MG: 4 INJECTION, SOLUTION INTRA-ARTICULAR; INTRALESIONAL; INTRAMUSCULAR; INTRAVENOUS; SOFT TISSUE at 16:03

## 2025-01-08 RX ADMIN — ACETAMINOPHEN 975 MG: 325 TABLET, FILM COATED ORAL at 11:46

## 2025-01-08 RX ADMIN — SODIUM CHLORIDE, POTASSIUM CHLORIDE, SODIUM LACTATE AND CALCIUM CHLORIDE: 600; 310; 30; 20 INJECTION, SOLUTION INTRAVENOUS at 16:00

## 2025-01-08 RX ADMIN — SODIUM CHLORIDE, POTASSIUM CHLORIDE, SODIUM LACTATE AND CALCIUM CHLORIDE: 600; 310; 30; 20 INJECTION, SOLUTION INTRAVENOUS at 17:17

## 2025-01-08 RX ADMIN — SODIUM CHLORIDE, POTASSIUM CHLORIDE, SODIUM LACTATE AND CALCIUM CHLORIDE 100 ML/HR: 600; 310; 30; 20 INJECTION, SOLUTION INTRAVENOUS at 11:53

## 2025-01-08 RX ADMIN — ONDANSETRON HYDROCHLORIDE 4 MG: 2 SOLUTION INTRAMUSCULAR; INTRAVENOUS at 16:03

## 2025-01-08 RX ADMIN — ROCURONIUM BROMIDE 50 MG: 50 INJECTION, SOLUTION INTRAVENOUS at 16:03

## 2025-01-08 RX ADMIN — ALBUTEROL SULFATE 2.5 MG: 2.5 SOLUTION RESPIRATORY (INHALATION) at 12:02

## 2025-01-08 RX ADMIN — SUGAMMADEX 200 MG: 100 INJECTION, SOLUTION INTRAVENOUS at 16:51

## 2025-01-08 RX ADMIN — KETOROLAC TROMETHAMINE 30 MG: 30 INJECTION, SOLUTION INTRAMUSCULAR at 16:42

## 2025-01-08 RX ADMIN — SODIUM CHLORIDE, POTASSIUM CHLORIDE, SODIUM LACTATE AND CALCIUM CHLORIDE: 600; 310; 30; 20 INJECTION, SOLUTION INTRAVENOUS at 16:52

## 2025-01-08 ASSESSMENT — ACTIVITIES OF DAILY LIVING (ADL)
ADLS_ACUITY_SCORE: 26
ADLS_ACUITY_SCORE: 27
ADLS_ACUITY_SCORE: 26
ADLS_ACUITY_SCORE: 27
ADLS_ACUITY_SCORE: 27
ADLS_ACUITY_SCORE: 26
ADLS_ACUITY_SCORE: 27
ADLS_ACUITY_SCORE: 27
ADLS_ACUITY_SCORE: 26

## 2025-01-08 NOTE — PROGRESS NOTES
[facility] Obstetrics Postpartum Progress Note    S: Flaquita is doing and feeling well this morning.  Lochia = menses, no clots. Is having some cramping while nursing. Breast feeding is going well, did try to supplement with formula which baby did not like.  Voiding spontaneously. Would like tubal sterilization today.    O:  Vitals:    25 0837 25 1425 25 1948 25 0109   BP: 116/58 119/68 124/56 116/60   BP Location: Right arm Right arm Right arm Right arm   Patient Position:  Sitting Semi-Lloyd's Semi-Lloyd's   Cuff Size: Adult Regular Adult Regular Adult Regular Adult Regular   Pulse: 71 76     Resp: 16 18 18 16   Temp: 97.9  F (36.6  C) 97.5  F (36.4  C) 97.5  F (36.4  C) 97.3  F (36.3  C)   TempSrc: Temporal Temporal Temporal Temporal   SpO2: 97% 98% 91%      Gen: Resting quietly, NAD   Resp: nonlabored breathing  Abd: soft, nondistended, appropriately TTP, FF at U-2  Ext: non-tender, trace edema, no erythema    Hemoglobin   Date Value Ref Range Status   2025 11.8 11.7 - 15.7 g/dL Final   2025 12.4 11.7 - 15.7 g/dL Final       A: 34 year old  PPD#2 s/p      P:   1. Routine postpartum cares.    2. Heme: 12.4 >  mL > 11.8  3. Rh+, RI  4. Infant: Doing well, feeding via breast  5. Birth control: Desires sterilization, scheduled for today      Anticipate discharge home after her surgery    Rivka Ordonez MD

## 2025-01-08 NOTE — ANESTHESIA PREPROCEDURE EVALUATION
Anesthesia Pre-Procedure Evaluation    Patient: Flaquita Shetyt   MRN: [id number] : 1990        Procedure : Procedure(s):  Minilaparotomy, bilateral salpingectomy          Past Medical History:   Diagnosis Date     Depressive disorder      Genital herpes     Partner also has genital herpes     Postpartum depression      Varicella       Past Surgical History:   Procedure Laterality Date     SPINAL FUSION Bilateral 10/01/2021      Allergies   Allergen Reactions     Nitrofurantoin Hives     hives     Sulfa Antibiotics Hives and Swelling     States her lips became swollen. thrush on lips and hives form sulfa     Bactrim [Sulfamethoxazole-Trimethoprim] Hives      Social History     Tobacco Use     Smoking status: Former     Types: Cigarettes     Smokeless tobacco: Never   Substance Use Topics     Alcohol use: Not Currently      Wt Readings from Last 1 Encounters:   25 97.6 kg (215 lb 1.6 oz)        Anesthesia Evaluation   Pt has had prior anesthetic.     No history of anesthetic complications       ROS/MED HX  ENT/Pulmonary:     (+)                     Intermittent, asthma                  Neurologic: Comment: C6 fusion, good neck extension and adequate mouth opening.       Cardiovascular:  - neg cardiovascular ROS     METS/Exercise Tolerance: >4 METS    Hematologic:       Musculoskeletal:       GI/Hepatic:     (+) GERD,                   Renal/Genitourinary:       Endo:       Psychiatric/Substance Use:     (+) psychiatric history depression       Infectious Disease:       Malignancy:       Other:          Physical Exam    Airway  airway exam normal      Mallampati: II   TM distance: > 3 FB   Neck ROM: full   Mouth opening: > 3 cm    Respiratory Devices and Support         Dental       (+) Minor Abnormalities - some fillings, tiny chips      Cardiovascular   cardiovascular exam normal       Rhythm and rate: regular and normal     Pulmonary   pulmonary exam normal        breath sounds clear to auscultation        OUTSIDE LABS:  CBC:   Lab Results   Component Value Date    WBC 9.9 01/06/2025    WBC 10.5 10/11/2024    HGB 11.8 01/07/2025    HGB 12.4 01/06/2025    HCT 36.2 01/06/2025    HCT 34.6 (L) 10/11/2024     01/06/2025     10/11/2024     BMP:   Lab Results   Component Value Date     07/10/2024     06/28/2024    POTASSIUM 3.9 07/10/2024    POTASSIUM 3.8 06/28/2024    CHLORIDE 101 07/10/2024    CHLORIDE 104 06/28/2024    CO2 24 07/10/2024    CO2 21 (L) 06/28/2024    BUN 8.3 07/10/2024    BUN 10.4 06/28/2024    CR 0.57 07/10/2024    CR 0.54 06/28/2024    GLC 79 01/08/2025    GLC 82 07/10/2024     COAGS:   Lab Results   Component Value Date    PTT 26 07/10/2024    INR 1.05 07/10/2024     POC:   Lab Results   Component Value Date    HCG Negative 04/30/2024     HEPATIC:   Lab Results   Component Value Date    ALBUMIN 4.4 07/10/2024    PROTTOTAL 7.9 07/10/2024    ALT 11 07/10/2024    AST 14 07/10/2024    ALKPHOS 61 07/10/2024    BILITOTAL 0.2 07/10/2024     OTHER:   Lab Results   Component Value Date    A1C 4.7 06/28/2024    VICKEY 9.6 07/10/2024    MAG 2.0 07/10/2024       Anesthesia Plan    ASA Status:  2    NPO Status:  NPO Appropriate    Anesthesia Type: General.     - Airway: ETT   Induction: Intravenous.   Maintenance: Balanced.        Consents    Anesthesia Plan(s) and associated risks, benefits, and realistic alternatives discussed. Questions answered and patient/representative(s) expressed understanding.     - Discussed: Risks, Benefits and Alternatives for BOTH SEDATION and the PROCEDURE were discussed     - Discussed with:  Patient, Spouse      - Extended Intubation/Ventilatory Support Discussed: No.      - Patient is DNR/DNI Status: No     Use of blood products discussed: No .     Postoperative Care    Pain management: IV analgesics, Multi-modal analgesia.   PONV prophylaxis: Ondansetron (or other 5HT-3), Dexamethasone or Solumedrol     Comments:    Other Comments: Risks, benefits and  alternatives discussed and would like to proceed. General anesthesia ok if indicated.              VIVIAN Daley CRNA    I have reviewed the pertinent notes and labs in the chart from the past 30 days and (re)examined the patient.  Any updates or changes from those notes are reflected in this note.                              # Asthma: noted on problem list

## 2025-01-08 NOTE — ANESTHESIA POSTPROCEDURE EVALUATION
Patient: Flaquita Shetty    Procedure: Procedure(s):  Minilaparotomy, bilateral salpingectomy       Anesthesia Type:  General    Note:  Disposition: Inpatient   Postop Pain Control: Uneventful            Sign Out: Well controlled pain   PONV: No   Neuro/Psych: Uneventful            Sign Out: Acceptable/Baseline neuro status   Airway/Respiratory: Uneventful            Sign Out: Acceptable/Baseline resp. status   CV/Hemodynamics: Uneventful            Sign Out: Acceptable CV status; No obvious hypovolemia; No obvious fluid overload   Other NRE: NONE   DID A NON-ROUTINE EVENT OCCUR? No       Last vitals:  Vitals Value Taken Time   /69 01/08/25 1730   Temp 97.7  F (36.5  C) 01/08/25 1729   Pulse 60 01/08/25 1733   Resp 22 01/08/25 1733   SpO2 97 % 01/08/25 1733   Vitals shown include unfiled device data.    Electronically Signed By: VIVIAN Daley CRNA  January 8, 2025  5:37 PM

## 2025-01-08 NOTE — ANESTHESIA CARE TRANSFER NOTE
Patient: Flaquita Shetty    Procedure: Procedure(s):  Minilaparotomy, bilateral salpingectomy       Diagnosis: Admission for sterilization [Z30.2]  Diagnosis Additional Information: No value filed.    Anesthesia Type:   General     Note:    Oropharynx: oropharynx clear of all foreign objects  Level of Consciousness: awake  Oxygen Supplementation: room air    Independent Airway: airway patency satisfactory and stable  Dentition: dentition unchanged  Vital Signs Stable: post-procedure vital signs reviewed and stable  Report to RN Given: handoff report given  Patient transferred to: PACU    Handoff Report: Identifed the Patient, Identified the Reponsible Provider, Reviewed the pertinent medical history, Discussed the surgical course, Reviewed Intra-OP anesthesia mangement and issues during anesthesia, Set expectations for post-procedure period and Allowed opportunity for questions and acknowledgement of understanding  Vitals:  Vitals Value Taken Time   BP     Temp 97.2  F (36.2  C) 01/08/25 1659   Pulse 69 01/08/25 1659   Resp 12 01/08/25 1659   SpO2 99 % 01/08/25 1700   Vitals shown include unfiled device data.    Electronically Signed By: VIVIAN Daley CRNA  January 8, 2025  5:01 PM

## 2025-01-08 NOTE — ANESTHESIA PROCEDURE NOTES
Airway         Procedure Start/Stop Times: 1/8/2025 4:07 PM  Staff -        CRNA: Idris Giraldo APRN CRNA       Performed By: CRNAIndications and Patient Condition       Indications for airway management: cayla-procedural       Induction type:intravenous       Mask difficulty assessment: 2 - vent by mask + OA or adjuvant +/- NMBA    Final Airway Details       Final airway type: endotracheal airway       Successful airway: ETT - single  Endotracheal Airway Details        ETT size (mm): 7.0       Cuffed: yes       Successful intubation technique: direct laryngoscopy       DL Blade Type: Florentino 2       Grade View of Cords: 1       Adjucts: stylet       Position: Right       Measured from: gums/teeth       Secured at (cm): 22       Bite block used: None    Post intubation assessment        Placement verified by: capnometry, equal breath sounds and chest rise        Number of attempts at approach: 1       Number of other approaches attempted: 0       Secured with: tape       Ease of procedure: easy       Dentition: Intact and Unchanged    Medication(s) Administered   Medication Administration Time: 1/8/2025 4:07 PM

## 2025-01-08 NOTE — PROGRESS NOTES
Patient requesting post partum bilateral tubal ligation. Surgical consent signed, education completed and questions answered per José Luis CASTILLO. Patient currently being prepared for surgery.

## 2025-01-08 NOTE — PROGRESS NOTES
OB Visit    S: Patient is feeling ok. Getting more uncomfortable and noticing more pelvic pressure.    Denies LOF, VB, or regular Ctx. + FM.     O: /74   Pulse 68   Wt 97.7 kg (215 lb 4.8 oz)   LMP 2024 (Exact Date)   BMI 32.74 kg/m    Gen: Well-appearing, NAD  Resp:  no increased work of breathing  Abd:  soft, non tender, non distended  Cvx: /-2  Ext: Trace edema    FHT: 130 bpm  EFW: 7.5 lb    A/P:  Flaquita Shetty is a 34 year old  at 37w4d by LMP CW 9 week US here for return OB visit.     1. Obesity: Pre E labs, A1C, ASA 81mg at 12 weeks, growth at 28 & 34  weeks  2. HSV: plan suppression starting at 36 weeks, Rx sent today  3. Maternal depression: doing okay   4. Tension headache: reglan benadryl, chiro, PT as needed   5. LGA. S>D- growth scans planned. 12/10 US EFW 2820 g, EFW 89%tile, AC > 98%tile     Prenatal labs WNL, Rh +, Rubella immune, GCT failed, passed 3 hour, 3rd Trimester HGB 11.8, GBS TBD   Rhogam: NA  Genetics: low risk XY, low risk carrier  Imaging: dating at 9 weeks, anatomy WNL  Immunizations: TDAP 24, FLU declined, RSV 24  Delivery Plan: epidural, breastfeeding   BC after delivery: Tubal after delivery.   Delivery Hx:  X2- pelvis proven to [id number]     RTC 2 weeks    Rivka Ordonez MD

## 2025-01-08 NOTE — OR NURSING
PACU Transfer Note      PACU Respiratory Event Documentation     1) Episodes of Apnea greater than or equal to 10 seconds: 0    2) Bradypnea - less than 8 breaths per minute: 0    3) Pain score on 0 to 10 scale: 2    4) Pain-sedation mismatch (yes or no): no    5) Repeated 02 desaturation less than 90% (yes or no): no    Anesthesia notified? (yes or no): no    Any of the above events occuring repeatedly in separate 30 minute intervals may be considered recurrent PACU respiratory events.    Patient stable and meets phase 1 discharge criteria for transport from PACU.      Report to valerie NOVOA

## 2025-01-08 NOTE — PROGRESS NOTES
Pt remains on room air.  Pt requested and received PRN albuterol.  BS clear pre and post treatment. Pt reports breathing improved post treatment. Respiratory will continue to provide support as needed.    Nayely Caicedo, RT

## 2025-01-08 NOTE — LACTATION NOTE
This note was copied from a baby's chart.  INPATIENT LACTATION CONSULT      Consult with Flaquita and tammy regarding breastfeeding.  Flaquita plans to do both breast and formula. Information provided on the effects of introducing formula early and breastfeeding difficulties. Obvious rooting with a strong latch observed this feeding session.  Rhythmic and aggressive suckling also noted.  Instructed Flaquita on correct positioning and technique when latching babe on.  Flaquita is independent with latching babe onto breast.  Minimal assistance required.  Encouraged Flaquita on the importance of frequent feedings throughout the day (at least 8-12 feedings in a 24 hour period) and skin to skin contact.  Flaquita demonstrated and states she understands all information given.    Ana Gray RN, IBCLC  Lactation Consultant  [facility]

## 2025-01-08 NOTE — PLAN OF CARE
"Pt's VSS. Fundus is firm, 1 below the U, scant bleeding, and no clots. Pt reports pain while breastfeeding due to cramping. Pain has been managed with Tylenol and Ibuprofen. She is breastfeeding every 2-3 hours and on demand. Pt is on a clear liquid diet from 0300 until 1020 then will be NPO for tubal sterilization procedure. Pt has showered at night with chlorhexidine and will shower again in the AM. Pt is agreeable with plan.     Problem: Adult Inpatient Plan of Care  Goal: Plan of Care Review  Description: The Plan of Care Review/Shift note should be completed every shift.  The Outcome Evaluation is a brief statement about your assessment that the patient is improving, declining, or no change.  This information will be displayed automatically on your shift  note.  Outcome: Progressing  Goal: Patient-Specific Goal (Individualized)  Description: You can add care plan individualizations to a care plan. Examples of Individualization might be:  \"Parent requests to be called daily at 9am for status\", \"I have a hard time hearing out of my right ear\", or \"Do not touch me to wake me up as it startles  me\".  Outcome: Progressing  Goal: Absence of Hospital-Acquired Illness or Injury  Outcome: Progressing  Intervention: Prevent Skin Injury  Recent Flowsheet Documentation  Taken 1/8/2025 0115 by Priscilla Garza RN  Body Position:   position changed independently   sitting up in bed  Goal: Optimal Comfort and Wellbeing  Outcome: Progressing  Intervention: Monitor Pain and Promote Comfort  Recent Flowsheet Documentation  Taken 1/8/2025 0106 by Priscilla Garza RN  Pain Management Interventions: medication (see MAR)  Intervention: Provide Person-Centered Care  Recent Flowsheet Documentation  Taken 1/8/2025 0115 by Priscilla Garza RN  Trust Relationship/Rapport:   care explained   choices provided   emotional support provided   empathic listening provided   questions answered   questions encouraged   reassurance " provided   thoughts/feelings acknowledged  Goal: Readiness for Transition of Care  Outcome: Progressing     Problem: Postpartum (Vaginal Delivery)  Goal: Successful Parent Role Transition  Outcome: Progressing  Goal: Hemostasis  Outcome: Progressing  Goal: Absence of Infection Signs and Symptoms  Outcome: Progressing  Intervention: Prevent or Manage Infection  Recent Flowsheet Documentation  Taken 1/8/2025 0115 by Priscilla Garza RN  Infection Management: aseptic technique maintained  Goal: Anesthesia/Sedation Recovery  Outcome: Progressing  Goal: Optimal Pain Control and Function  Outcome: Progressing  Intervention: Prevent or Manage Pain  Recent Flowsheet Documentation  Taken 1/8/2025 0115 by Priscilla Garza RN  Perineal Care:   absorbent brief/pad changed   medicated pads applied   perineal spray bottle/warm water use encouraged   perineum cleansed  Taken 1/8/2025 0106 by Priscilla Garza RN  Pain Management Interventions: medication (see MAR)  Goal: Effective Urinary Elimination  Outcome: Progressing     Problem: Breastfeeding  Goal: Effective Breastfeeding  Outcome: Progressing

## 2025-01-09 NOTE — OP NOTE
[facility]  Full Operative Note    Date of Service:  2025    Surgeon: Rivka Ordonez MD     Preop Dx: 1. Undesired fertility    2. PPD#2 s/p   Postop Dx: 1. Same  Procedure: Minilaparotomy, bilateral salpingectomy    Anesthesia: General  EBL: 10 cc  Specimens: Right fallopian tube, Left fallopian tube  Complications: None apparent    Indications: Ms. Flaquita Shetty is a 34 year old  on PPD#2 after an uncomplicated , who desires sterilization. The patient is certain that she wants no future children. The risks of the procedure were discussed, including: Regret, failure rate (<6:1000 risk of pregnancy), increased risk of ectopic gestation should pregnancy occur, bleeding, infection, and injury to other organs. Other forms of non-permanent contraception and other methods for permanent sterilization were discussed. The patient was given time to ask questions and stated that she was certain that she wanted to proceed with the procedure.     Findings:  - Fundus firm at 2 fingerbreadths under the umbilicus    Procedure Details:  The patient was brought to the operating room, where adequate general anesthesia was administered. She was placed in the dorsal supine position, and prepped and draped in the usual sterile fashion. Surgical time out was performed. A 4 cm infraumbilical incision was made and carried down to the underlying fascia with the scalpel. The fascia was sharply incised in the midline. The peritoneum was identified and entered bluntly. The incision in the fascia and peritoneum was extended laterally with Galvan scissors. The Gabriel O retractor was placed. The right fallopian tube was identified and grasped with the [place] clamps. The fallopian tube was carried out to the fimbriated end. Using a handheld ligasure device, the mesosalpinx was cauterized and divided along the length of the fallopian tube. The fallopian tube was then amputated at the level of the cornua. Hemostasis was  assured. Attention was turned to the left fallopian tube, which was identified and grasped with the [place] clamps. The fallopian tube was carried out to the fimbriated end and was similarly excised. Hemostasis was noted.. The fascia was closed with a running suture of 0 Vicryl. The subcutaneous space was irrigated and then closed with interrupted sutures of 3-0 Vicryl. The skin was closed with 4-0 Monocryl and covered with Dermabond.    All sponge, needle, and instrument counts were correct. The patient tolerated the procedure well and was transferred to recovery in stable condition.     Rivka Ordonez MD

## 2025-01-09 NOTE — PLAN OF CARE
"  Problem: Adult Inpatient Plan of Care  Goal: Plan of Care Review  Description: The Plan of Care Review/Shift note should be completed every shift.  The Outcome Evaluation is a brief statement about your assessment that the patient is improving, declining, or no change.  This information will be displayed automatically on your shift  note.  Outcome: Met  Goal: Patient-Specific Goal (Individualized)  Description: You can add care plan individualizations to a care plan. Examples of Individualization might be:  \"Parent requests to be called daily at 9am for status\", \"I have a hard time hearing out of my right ear\", or \"Do not touch me to wake me up as it startles  me\".  Outcome: Met  Goal: Absence of Hospital-Acquired Illness or Injury  Outcome: Met  Intervention: Prevent Infection  Recent Flowsheet Documentation  Taken 1/8/2025 1915 by Priscilla Garza RN  Infection Prevention:   single patient room provided   rest/sleep promoted   hand hygiene promoted   equipment surfaces disinfected   environmental surveillance performed   cohorting utilized  Goal: Optimal Comfort and Wellbeing  Outcome: Met  Intervention: Provide Person-Centered Care  Recent Flowsheet Documentation  Taken 1/8/2025 1915 by Priscilla Garza RN  Trust Relationship/Rapport: care explained  Goal: Readiness for Transition of Care  Outcome: Met     Problem: Postpartum (Vaginal Delivery)  Goal: Successful Parent Role Transition  Outcome: Met  Goal: Hemostasis  Outcome: Met  Goal: Absence of Infection Signs and Symptoms  Outcome: Met  Goal: Anesthesia/Sedation Recovery  Outcome: Met  Goal: Optimal Pain Control and Function  Outcome: Met  Goal: Effective Urinary Elimination  Outcome: Met     Problem: Breastfeeding  Goal: Effective Breastfeeding  Outcome: Met     "

## 2025-01-09 NOTE — PROGRESS NOTES
Ate a regular diet and tolerated well. Up to bathroom voided 400 ml of urine W/O difficulty. Tolerated activity, she offers no complaints at this time.

## 2025-01-09 NOTE — PROGRESS NOTES
Discharge Note      Data:  Flaquita Shetty discharged to home at 2109 via ambulation. Accompanied by significant other and staff.    Action:  Written discharge/follow-up instructions were provided to patient. Prescriptions sent to patients preferred pharmacy. All belongings sent with patient.    Response:  Pt verbalized understanding of discharge instructions, reason for discharge, and necessary follow-up appointments. Without further questions or concerns at this time.

## 2025-01-09 NOTE — DISCHARGE INSTRUCTIONS
Warning Signs after Having a Baby    Keep this paper on your fridge or somewhere else where you can see it.    Call your provider if you have any of these symptoms up to 12 weeks after having your baby.    Thoughts of hurting yourself or your baby  Pain in your chest or trouble breathing  Severe headache not helped by pain medicine  Eyesight concerns (blurry vision, seeing spots or flashes of light, other changes to eyesight)  Fainting, shaking or other signs of a seizure    Call 9-1-1 if you feel that it is an emergency.     The symptoms below can happen to anyone after giving birth. They can be very serious. Call your provider if you have any of these warning signs.    My provider s phone number: _______________________    Losing too much blood (hemorrhage)    Call your provider if you soak through a pad in less than an hour or pass blood clots bigger than a golf ball. These may be signs that you are bleeding too much.    Blood clots in the legs or lungs    After you give birth, your body naturally clots its blood to help prevent blood loss. Sometimes this increased clotting can happen in other areas of the body, like the legs or lungs. This can block your blood flow and be very dangerous.     Call your provider if you:  Have a red, swollen spot on the back of your leg that is warm or painful when you touch it.   Are coughing up blood.     Infection    Call your provider if you have any of these symptoms:  Fever of 100.4 F (38 C) or higher.  Pain or redness around your stitches if you had an incision.   Any yellow, white, or green fluid coming from places where you had stitches or surgery.    Mood Problems (postpartum depression)    Many people feel sad or have mood changes after having a baby. But for some people, these mood swings are worse.     Call your provider right away if you feel so anxious or nervous that you can't care for yourself or your baby.    Preeclampsia (high blood pressure)    Even if you  didn't have high blood pressure when you were pregnant, you are at risk for the high blood pressure disease called preeclampsia. This risk can last up to 12 weeks after giving birth.     Call your provider if you have:   Pain on your right side under your rib cage  Sudden swelling in the hands and face    Remember: You know your body. If something doesn't feel right, get medical help.     For informational purposes only. Not to replace the advice of your health care provider. Copyright 2020 [facility]. All rights reserved. Clinically reviewed by Hollie Rapp, RNC-OB, MSN. [employer] [id number] - Rev 02/23.

## 2025-01-09 NOTE — PROGRESS NOTES
Report from PACU Kathe NOVOA. Patient settled in to room 403, IV infusing, head to toe assessment WNL, VSS. Abd incision CDI, resting in bed, she offers no complaints at this time.

## 2025-01-13 NOTE — DISCHARGE SUMMARY
Admit date: 2025  Discharge date: 2025    Admit Dx:   - 34 year old  at 38w1d   - latent labor    Discharge Dx:  - Same as above, s/p   - Bilateral salpingectomy    Procedures:  -   - bilateral postpartum salpingectomy    Admit HPI:  Ms. Flaquita Shetty is a   at 38w1d who was admitted for latent labor on 2025. Please see her admit H&P for full details of her PMH, PSH, Meds, Allergies and exam on admit.    Hospital course:  Flaquita Shetty was admitted to the hospital on 2025 for the above listed indications. Her labor course and delivery were uncomplicated. She had a postpartum bilateral salpingectomy on POD2. She was meeting appropriate milestones for discharge.    Discharge Medications:  Discharge Medication List as of 2025  7:35 PM        START taking these medications    Details   oxyCODONE (ROXICODONE) 5 MG tablet Take 1 tablet (5 mg) by mouth every 6 hours as needed for moderate pain., Disp-10 tablet, R-0, Local Print           CONTINUE these medications which have NOT CHANGED    Details   albuterol (PROAIR HFA/PROVENTIL HFA/VENTOLIN HFA) 108 (90 Base) MCG/ACT inhaler Inhale 1-2 puffs into the lungs every 4 hours as needed for shortness of breath, wheezing or cough., Disp-18 g, R-0, E-PrescribePharmacy may dispense brand covered by insurance (Proair, or proventil or ventolin or generic albuterol inhaler)      chlorhexidine (PERIDEX) 0.12 % solution daily as needed., Historical      magnesium 250 MG tablet Take 1 tablet by mouth daily., Historical      metoclopramide (REGLAN) 10 MG tablet Take 1 tablet (10 mg) by mouth 3 times daily as needed (headache), Disp-60 tablet, R-0, E-Prescribe      Prenatal Vit-Fe Fumarate-FA (PRENATAL MULTIVITAMIN  PLUS IRON) 27-1 MG TABS Take 1 tablet by mouth daily, Historical           STOP taking these medications       nystatin (MYCOSTATIN) 152828 UNIT/ML suspension Comments:   Reason for Stopping:         valACYclovir (VALTREX) 500 MG  tablet Comments:   Reason for Stopping:               Discharge/Disposition:  Flaquita Shetty was discharged to home in stable condition    Kristin Pastrana MD on 1/13/2025 at 4:51 PM

## 2025-01-15 LAB
PATH REPORT.COMMENTS IMP SPEC: NORMAL
PATH REPORT.FINAL DX SPEC: NORMAL
PATH REPORT.RELEVANT HX SPEC: NORMAL
PHOTO IMAGE: NORMAL

## 2025-01-21 ENCOUNTER — OFFICE VISIT (OUTPATIENT)
Dept: OBGYN | Facility: OTHER | Age: 35
End: 2025-01-21
Attending: PEDIATRICS
Payer: COMMERCIAL

## 2025-01-21 VITALS
HEIGHT: 68 IN | OXYGEN SATURATION: 96 % | SYSTOLIC BLOOD PRESSURE: 114 MMHG | WEIGHT: 204 LBS | DIASTOLIC BLOOD PRESSURE: 60 MMHG | RESPIRATION RATE: 16 BRPM | TEMPERATURE: 98.6 F | BODY MASS INDEX: 30.92 KG/M2 | HEART RATE: 67 BPM

## 2025-01-21 DIAGNOSIS — Z90.79 STATUS POST BILATERAL SALPINGECTOMY: Primary | ICD-10-CM

## 2025-01-21 DIAGNOSIS — M54.2 NECK PAIN: ICD-10-CM

## 2025-01-21 PROCEDURE — 99024 POSTOP FOLLOW-UP VISIT: CPT | Performed by: OBSTETRICS & GYNECOLOGY

## 2025-01-21 ASSESSMENT — PAIN SCALES - GENERAL: PAINLEVEL_OUTOF10: NO PAIN (0)

## 2025-01-21 NOTE — NURSING NOTE
"Chief Complaint   Patient presents with    Surgical Followup     Tubal   Patient presents to OB/GYN clinic for a surgical follow up from a Tubal. Patient stated she does not have any questions or concerns at this time.   Montserrat Leyva LPN on 1/21/2025 at 11:37 AM   Ext. 1161    Initial /60   Pulse 67   Temp 98.6  F (37  C) (Tympanic)   Resp 16   Ht 1.727 m (5' 8\")   Wt 92.5 kg (204 lb)   LMP 04/14/2024 (Exact Date)   SpO2 96%   Breastfeeding Yes   BMI 31.02 kg/m   Estimated body mass index is 31.02 kg/m  as calculated from the following:    Height as of this encounter: 1.727 m (5' 8\").    Weight as of this encounter: 92.5 kg (204 lb).  Medication Reconciliation: complete    Montserrat Leyva LPN  "

## 2025-01-22 ENCOUNTER — MEDICAL CORRESPONDENCE (OUTPATIENT)
Dept: HEALTH INFORMATION MANAGEMENT | Facility: OTHER | Age: 35
End: 2025-01-22
Payer: COMMERCIAL

## 2025-01-28 NOTE — PROGRESS NOTES
"[facility]  Gynecology visit  CC: 2 week follow up visit      S: Flaquita Shetty is a 34 year old  who presents today for  2 week postpartum visit.  She had a  on 25 and had a minilaparotomy with bilateral salpingectomy on 25    - Has been doing and feeling ok since she was discharged home  - Feels like her neck is out.  Has done PT and chiropractics in the past.  Has an appointment with her chiropractor on Friday. Would like a referral to PT as well  - Is currently breast and formula feeding  - Lochia is light  - Denies mood concerns  - Feels like her incision is healing well. Not needing pain medications now. Denies redness or drainage from incision site.       O: /60   Pulse 67   Temp 98.6  F (37  C) (Tympanic)   Resp 16   Ht 1.727 m (5' 8\")   Wt 92.5 kg (204 lb)   LMP 2024 (Exact Date)   SpO2 96%   Breastfeeding Yes   BMI 31.02 kg/m     General: No distress   Resp: breathing comfortably on room air  Neuro: grossly intact  Abdomen: Soft, non-tender, non-distended, no masses. Well healed infraumbilical incision without surrounding erythema or induration.       A/p: Flaquita Shetty is a 34 year old  who presents today for a 2 week PP visit     # Routine PP care  - Plan continued pelvic rest for a total of 6 weeks  - Denies mood concerns  - Has been working with lactation     # S/p bilateral salpingectomy  - Incision is healing well, no longer having pain  - Discussed benign pathology  - Discussed that this is effective immediately as contraception    # Neck pain  - Has follow up scheduled with her chiropractor  - Referral for PT placed as well    Flaquita will keep her 6 week PP visit     Rivka Ordonez MD     "

## 2025-02-10 ENCOUNTER — THERAPY VISIT (OUTPATIENT)
Dept: PHYSICAL THERAPY | Facility: OTHER | Age: 35
End: 2025-02-10
Attending: OBSTETRICS & GYNECOLOGY
Payer: COMMERCIAL

## 2025-02-10 DIAGNOSIS — M54.2 NECK PAIN: ICD-10-CM

## 2025-02-10 PROCEDURE — 97140 MANUAL THERAPY 1/> REGIONS: CPT | Mod: GP

## 2025-02-10 PROCEDURE — 97161 PT EVAL LOW COMPLEX 20 MIN: CPT | Mod: GP

## 2025-02-10 PROCEDURE — 97112 NEUROMUSCULAR REEDUCATION: CPT | Mod: GP

## 2025-02-10 NOTE — PROGRESS NOTES
PHYSICAL THERAPY EVALUATION  Type of Visit: Evaluation    Subjective         Presenting condition or subjective complaint:  Referred to PT with worsening neck pain and right arm pain numbness/weakness/burning. These are new symptoms. Has a history of neck pain but with pain on left side. Recently delivered third child,  on . The burning in to the right arm and hand is was bothers her the most. Hard time using arm to hold baby. Patient is right handed. Returns to work in March. No headaches.   Date of onset: 25    Relevant medical history:     Past Medical History:   Diagnosis Date    Depressive disorder     Genital herpes     Partner also has genital herpes    Postpartum depression     Varicella          Dates & types of surgery:    Past Surgical History:   Procedure Laterality Date    LAPAROTOMY MINI, TUBAL LIGATION (POST PARTUM), COMBINED Bilateral 2025    Procedure: Minilaparotomy, bilateral salpingectomy;  Surgeon: Rivka Ordonez MD;  Location: [facility] OR    SPINAL FUSION Bilateral 10/01/2021   Fusion C5-6 in     Prior diagnostic imaging/testing results:     see EHR  Prior therapy history for the same diagnosis, illness or injury:    NA    Prior Level of Function  Transfers: Independent  Ambulation: Independent  ADL: Independent      Patient goals for therapy:  reduce neck pain, right arm pain    Pain assessment: Pain present  Location: neck, right arm/Rating: 3-7/10     Objective   CERVICAL SPINE EVALUATION  PAIN: Pain is Exacerbated By: lifting, carrying sitting walking, reaching overhead, bending, self car tasks, house hold tasks  INTEGUMENTARY (edema, incisions): WNL  POSTURE:  hyperactivity of right upper trap    ROM:  Cervical and shoulder ROM WFL    MYOTOMES:  will continue to assess    NEURAL TENSION:    Left Right                                      Median Negative  Positive   Ulnar Negative  Positive   Radial  Negative  Positive       PALPATION:  Postural loading limited head R/R L/R  shoulder R/R; general listening to right anterior chest wall, neck and right head; local listening to right 2nd rib, C3.  SPINAL SEGMENTAL CONCLUSIONS:  ERS right C3      Assessment & Plan   CLINICAL IMPRESSIONS  Medical Diagnosis: M54.2 (ICD-10-CM) - Neck pain    Treatment Diagnosis: neck pain, right arm pain   Impression/Assessment: Patient is a 34 year old female with right neck and arm pain complaints.  The following significant findings have been identified: Pain, Decreased ROM/flexibility, Decreased strength, Impaired muscle performance, and Decreased activity tolerance. These impairments interfere with their ability to perform self care tasks, work tasks, recreational activities, household chores, driving , household mobility, and community mobility as compared to previous level of function.     Clinical Decision Making (Complexity):  Clinical Presentation: Evolving/Changing  Clinical Presentation Rationale: based on medical and personal factors listed in PT evaluation  Clinical Decision Making (Complexity): Low complexity    PLAN OF CARE  Treatment Interventions:  Modalities: Cryotherapy, E-stim, Hot Pack  Interventions: Manual Therapy, Neuromuscular Re-education, Therapeutic Activity, Therapeutic Exercise, Self-Care/Home Management    Long Term Goals     PT Goal 1  Goal Identifier: neck pain  Goal Description: Patient to report reduced neck and right arm pain to less than 3/10 to complete daily self care and house hold tasks with greater ease.  Target Date: 05/05/25  PT Goal 2  Goal Identifier: care of child  Goal Description: Patient to no longer report weakness and burning in to right arm and hand to allow greater ease of holding new born baby.  Target Date: 05/05/25  PT Goal 3  Goal Identifier: ULTT  Goal Description: Patient to have negative ULTT for right upper extremity showing reduced nerve tension in to right arm.  Target Date: 05/05/25      Frequency of Treatment: 1-2 times per week as  needed  Duration of Treatment: 12 weeks    Recommended Referrals to Other Professionals:  NA  Education Assessment:   Learner/Method: Patient;No Barriers to Learning    Risks and benefits of evaluation/treatment have been explained.   Patient/Family/caregiver agrees with Plan of Care.     Evaluation Time:     PT Eval, Low Complexity Minutes (69716): 15       Signing Clinician: Harleen King PT        [facility]                                                                                   OUTPATIENT PHYSICAL THERAPY      PLAN OF TREATMENT FOR OUTPATIENT REHABILITATION   Patient's Last Name, First Name, TONIAKarenANGEKaren ShettyFlaquita YOB: 1990   Provider's Name   [facility]   Medical Record No.  [id number]     Onset Date: 01/27/25  Start of Care Date: 02/20/25     Medical Diagnosis:  M54.2 (ICD-10-CM) - Neck pain      PT Treatment Diagnosis:  neck pain, right arm pain Plan of Treatment  Frequency/Duration: 1-2 times per week as needed/ 12 weeks    Certification date from 02/10/25 to 05/05/25         See note for plan of treatment details and functional goals     Harleen King, PT                         I CERTIFY THE NEED FOR THESE SERVICES FURNISHED UNDER        THIS PLAN OF TREATMENT AND WHILE UNDER MY CARE     (Physician attestation of this document indicates review and certification of the therapy plan).              Referring Provider:  Rivka Ordonez    Initial Assessment  See Epic Evaluation- Start of Care Date: 02/20/25

## 2025-02-25 ENCOUNTER — PRENATAL OFFICE VISIT (OUTPATIENT)
Dept: OBGYN | Facility: OTHER | Age: 35
End: 2025-02-25
Attending: NURSE PRACTITIONER
Payer: COMMERCIAL

## 2025-02-25 VITALS
SYSTOLIC BLOOD PRESSURE: 110 MMHG | WEIGHT: 206.4 LBS | BODY MASS INDEX: 31.38 KG/M2 | HEART RATE: 68 BPM | DIASTOLIC BLOOD PRESSURE: 62 MMHG

## 2025-02-25 DIAGNOSIS — M54.2 NECK PAIN: ICD-10-CM

## 2025-02-25 DIAGNOSIS — J06.9 UPPER RESPIRATORY TRACT INFECTION, UNSPECIFIED TYPE: ICD-10-CM

## 2025-02-25 ASSESSMENT — EDINBURGH POSTNATAL DEPRESSION SCALE (EPDS)
I HAVE BEEN SO UNHAPPY THAT I HAVE BEEN CRYING: ONLY OCCASIONALLY
I HAVE BEEN SO UNHAPPY THAT I HAVE BEEN CRYING: ONLY OCCASIONALLY
I HAVE BEEN ABLE TO LAUGH AND SEE THE FUNNY SIDE OF THINGS: AS MUCH AS I ALWAYS COULD
I HAVE BEEN SO UNHAPPY THAT I HAVE HAD DIFFICULTY SLEEPING: NOT AT ALL
THINGS HAVE BEEN GETTING ON TOP OF ME: NO, I HAVE BEEN COPING AS WELL AS EVER
I HAVE FELT SAD OR MISERABLE: NO, NOT AT ALL
I HAVE BEEN ABLE TO LAUGH AND SEE THE FUNNY SIDE OF THINGS: AS MUCH AS I ALWAYS COULD
I HAVE FELT SAD OR MISERABLE: NO, NOT AT ALL
I HAVE BEEN ANXIOUS OR WORRIED FOR NO GOOD REASON: NO, NOT AT ALL
I HAVE BLAMED MYSELF UNNECESSARILY WHEN THINGS WENT WRONG: NO, NEVER
I HAVE FELT SCARED OR PANICKY FOR NO GOOD REASON: NO, NOT AT ALL
THE THOUGHT OF HARMING MYSELF HAS OCCURRED TO ME: NEVER
THE THOUGHT OF HARMING MYSELF HAS OCCURRED TO ME: NEVER
I HAVE FELT SCARED OR PANICKY FOR NO GOOD REASON: NO, NOT AT ALL
I HAVE BLAMED MYSELF UNNECESSARILY WHEN THINGS WENT WRONG: NO, NEVER
I HAVE BEEN SO UNHAPPY THAT I HAVE HAD DIFFICULTY SLEEPING: NOT AT ALL
TOTAL SCORE: 1
I HAVE LOOKED FORWARD WITH ENJOYMENT TO THINGS: AS MUCH AS I EVER DID
I HAVE BEEN ANXIOUS OR WORRIED FOR NO GOOD REASON: NO, NOT AT ALL
I HAVE LOOKED FORWARD WITH ENJOYMENT TO THINGS: AS MUCH AS I EVER DID
THINGS HAVE BEEN GETTING ON TOP OF ME: NO, I HAVE BEEN COPING AS WELL AS EVER

## 2025-02-25 ASSESSMENT — PAIN SCALES - GENERAL: PAINLEVEL_OUTOF10: NO PAIN (0)

## 2025-02-25 NOTE — PROGRESS NOTES
Postpartum Check  S:  Ms. Flaquita Shetty is a 34 year old  here for her 6-week postpartum checkup.  Accompanied by significant other and son.  - Had a  at 38w1d on 2025.  Labor and delivery course were uncomplicated.  She had postpartum bilateral salpingectomy on postpartum day 2 states she has recovered well.  Denies any abdominal pain or incisional pain.    - Infant gender:  boy, weight 8 pounds 8 oz.  - Feeding Method:  .  Complications reported with feeding:  Nonenone, infant thriving .    - Bleeding:  None.  Duration:  5 weeks, has now stopped.  Menses resumed:  No  - Bowel/Urinary problems:  No  - Mood: Stable, doing well.  - Sleep: No concern. As much as she can with  cares     [place] Depression Scale  Thoughts of Harming Self: (Patient-Rptd) Never  Total Score: (Patient-Rptd) 1    - Contraception Planned:  s/p tubal ligation on 2025  - She  has had intercourse since delivery and experienced  No discomfort.  .    - Current tobacco use:  No  - Hx of Abuse:  No  ================================================================  ROS: 10 point ROS neg other than the symptoms noted above in the HPI.     O:  /62   Pulse 68   Wt 93.6 kg (206 lb 6.4 oz)   LMP 2024 (Exact Date)   Breastfeeding Yes   BMI 31.38 kg/m    Gen: Well-appearing, NAD  Psych:  NEGATIVE  Breast: Lactating-breast-feeding clinic, baby is latching well, no visible discomfort.  Abd:  Benign, Soft, flat, non-tender, No masses, organomegaly, and Diastasis less than 1-2 FB  Inc:   well healed and dry and intact   Pelvic: Normal external female genitalia.  No visible concerning external vaginal or anal lesions.  Normal vulva, cervix and vagina. Cervix visible and non friable. No Vaginal discharge . Vagina healing well.   Normal uterus, no adnexal mass or tenderness.  Chaperone present.     A/P:  Ms. Flaquita Shetty is a 34 year old  here for 6 week postpartum visit after  at 38w1d on  1/6/2025.  Labor and delivery course were uncomplicated.  She had postpartum bilateral salpingectomy on PPD 2 and has recovered well.    Postpartum:  - Mood: Stable, no concerns  - Contraception: Tubal ligation postpartum on 1/8/2025.  Abdominal incisions healed well.  - Feeding: Breast-feeding successfully, no concerns.    --Patient has had URI symptoms for the past 2 days.  Patient has nasal congestion, cough and sore throat.  She is wondering what is safe to take with breast-feeding.  She denies any fever, chills or bodyaches.  No chest pain or shortness of breath.  Breast-feeding safe supportive measures and over-the-counter remedies sent to patient in after visit summary.   --Patient continues to have neck pain not improving with physical therapy.  Status post spinal fusion October 2021.  Plan to follow-up with family practice or orthopedics for further evaluation and recommendations of neck pain.    - Follow-up: As needed for [facility] care    Kimmie Valentino NP  2/25/2025

## 2025-02-25 NOTE — PATIENT INSTRUCTIONS
Remember to get adequate rest as best as you can, stay well-hydrated with water.  Remember symptomatic measures with your sore throat including gargling with salt water multiple times a day, honey, throat lozenges and ibuprofen.      Safe pain relievers for breastfeeding moms    Ibuprofen. Advil or Motrin are the best options for symptoms like fever or a headache, since infants can tolerate higher doses, and much lower levels end up in breast milk.  Acetaminophen. Tylenol is a good choice, since it s unlikely to cause side effects in babies. (There s a reason they give this to new moms in the hospital!)  Naproxen sodium. Aleve is generally considered safe, but not as preferred as ibuprofen or acetaminophen, since it stays in your system (and baby s) for longer.    Cough medicine safe in breastfeeding  Dextromethorphan. This cold medicine is considered safe for breastfeeding moms and babies. Options like Robitussin, Delsym, Triaminic and Vicks DayQuil Cough are unlikely to affect your supply and have no reported infant side effects.    Decongestant medications to help with nasal congestions, can cause a dip in milk supply, but using them short term for a couple of days typically will not make much of an impact.  Especially since her milk supply is well-established.  Using a daytime non-drowsy zyertec once a day for a couple days can help with your congestion.  Can also use Miriam pot or nasal saline rinses to help with congestion.

## 2025-02-25 NOTE — NURSING NOTE
"Chief Complaint   Patient presents with    Postpartum Care     6wk check    Patient presents to OB/GYN clinic for a 6 week postpartum check. Patient denies any concerns at this time.  Montserrat Leyva LPN on 2/25/2025 at 1:12 PM      Initial /62   Pulse 68   Wt 93.6 kg (206 lb 6.4 oz)   LMP 04/14/2024 (Exact Date)   Breastfeeding Yes   BMI 31.38 kg/m   Estimated body mass index is 31.38 kg/m  as calculated from the following:    Height as of 1/21/25: 1.727 m (5' 8\").    Weight as of this encounter: 93.6 kg (206 lb 6.4 oz).  Medication Reconciliation: complete    Montserrat Leyva LPN  "

## 2025-03-10 ENCOUNTER — THERAPY VISIT (OUTPATIENT)
Dept: PHYSICAL THERAPY | Facility: OTHER | Age: 35
End: 2025-03-10
Attending: OBSTETRICS & GYNECOLOGY
Payer: COMMERCIAL

## 2025-03-10 DIAGNOSIS — M54.2 NECK PAIN: Primary | ICD-10-CM

## 2025-03-10 PROCEDURE — 97112 NEUROMUSCULAR REEDUCATION: CPT | Mod: GP

## 2025-03-10 PROCEDURE — 97140 MANUAL THERAPY 1/> REGIONS: CPT | Mod: GP

## 2025-03-10 NOTE — PROGRESS NOTES
PLAN  Patient referred back to clinic as she is in need of cervical MRI and referral to [facility]. History of fusion C5-7. Re-injury during child birth in January 2025. Same mechanism of injury that led to fusion surgery.     Beginning/End Dates of Progress Note Reporting Period:    to 03/10/2025    Referring Provider:  Rivka Ordonez           03/10/25 0500   Appointment Info   Signing clinician's name / credentials Harleen King, PT, CLT, CAPP   Visits Used 2   Medical Diagnosis M54.2 (ICD-10-CM) - Neck pain   PT Tx Diagnosis neck pain, right arm pain   Progress Note/Certification   Start of Care Date 02/20/25   Onset of illness/injury or Date of Surgery 01/27/25   Therapy Frequency 1-2 times per week as needed   Predicted Duration 12 weeks   Certification date from 02/10/25   Certification date to 05/05/25   PT Goal 1   Goal Identifier neck pain   Goal Description Patient to report reduced neck and right arm pain to less than 3/10 to complete daily self care and house hold tasks with greater ease.   Target Date 05/05/25   PT Goal 2   Goal Identifier care of child   Goal Description Patient to no longer report weakness and burning in to right arm and hand to allow greater ease of holding new born baby.   Target Date 05/05/25   PT Goal 3   Goal Identifier ULTT   Goal Description Patient to have negative ULTT for right upper extremity showing reduced nerve tension in to right arm.   Target Date 05/05/25   Subjective Report   Subjective Report Continues to have headaches, numbness and burning. Headaches up the back of head and around. Right arm and hand still dealing with numbness and burning. Arm feels weeks. Trying to get an appointment in the clinic. Currently does not have a PCP.   Objective Measures   Objective Measures Objective Measure 4   Objective Measure 1   Objective Measure postural loading   Details Limited loading head L/R, shoulder R/R; general listening to right neck; local  listening to C5, C6 nerve roots, brachial plexus   Objective Measure 2   Objective Measure myofascial   Details elevated right 2nd rib due to protective posture   Objective Measure 3   Objective Measure ULTT   Details + right ulnar, median and radial nerves with severe nerve tension   Objective Measure 4   Objective Measure Strength   Details noted  weakness right hand, weakness with triceps right   Neuromuscular Re-education   Neuromuscular re-ed of mvmt, balance, coord, kinesthetic sense, posture, proprioception minutes (26263) 25   Neuro Re-ed 1 muscle energy technique (MET), neuromeningeal manipulation (NMM) to restore function and reduce pain   Neuro Re-ed 1 - Details NMM- right brachial plexus, C5 and C6 nerve roots   Patient Response/Progress no change to ULTT, symptoms, head remains side bent right.   Manual Therapy   Manual Therapy: Mobilization, MFR, MLD, friction massage minutes (21559) 15   Manual Therapy 1 MFR, organ specific fascial mobilization (OSFM) to restore function and reduce pain   Manual Therapy 1 - Details MFR- unwinding C5 C6, cervial paraspinals, subclavian fascia, pec fascia   Education   Learner/Method Patient;No Barriers to Learning   Plan   Home program dural glides, scap sets   Plan for next session Patient being referred to clinic as she is in need of cervical MRI and referral to her surgeon at [facility] [place].

## 2025-03-13 ENCOUNTER — OFFICE VISIT (OUTPATIENT)
Dept: FAMILY MEDICINE | Facility: OTHER | Age: 35
End: 2025-03-13
Attending: FAMILY MEDICINE
Payer: COMMERCIAL

## 2025-03-13 VITALS
RESPIRATION RATE: 14 BRPM | BODY MASS INDEX: 32.52 KG/M2 | OXYGEN SATURATION: 98 % | SYSTOLIC BLOOD PRESSURE: 106 MMHG | HEART RATE: 76 BPM | DIASTOLIC BLOOD PRESSURE: 74 MMHG | HEIGHT: 68 IN | WEIGHT: 214.6 LBS | TEMPERATURE: 97.8 F

## 2025-03-13 DIAGNOSIS — M48.02 CERVICAL STENOSIS OF SPINAL CANAL: Primary | ICD-10-CM

## 2025-03-13 DIAGNOSIS — M54.2 NECK PAIN: ICD-10-CM

## 2025-03-13 DIAGNOSIS — M54.10 RADICULAR LOW BACK PAIN: ICD-10-CM

## 2025-03-13 ASSESSMENT — ASTHMA QUESTIONNAIRES
ACT_TOTALSCORE: 25
QUESTION_5 LAST FOUR WEEKS HOW WOULD YOU RATE YOUR ASTHMA CONTROL: COMPLETELY CONTROLLED
ACT_TOTALSCORE: 25
QUESTION_2 LAST FOUR WEEKS HOW OFTEN HAVE YOU HAD SHORTNESS OF BREATH: NOT AT ALL
QUESTION_1 LAST FOUR WEEKS HOW MUCH OF THE TIME DID YOUR ASTHMA KEEP YOU FROM GETTING AS MUCH DONE AT WORK, SCHOOL OR AT HOME: NONE OF THE TIME
QUESTION_3 LAST FOUR WEEKS HOW OFTEN DID YOUR ASTHMA SYMPTOMS (WHEEZING, COUGHING, SHORTNESS OF BREATH, CHEST TIGHTNESS OR PAIN) WAKE YOU UP AT NIGHT OR EARLIER THAN USUAL IN THE MORNING: NOT AT ALL
QUESTION_4 LAST FOUR WEEKS HOW OFTEN HAVE YOU USED YOUR RESCUE INHALER OR NEBULIZER MEDICATION (SUCH AS ALBUTEROL): NOT AT ALL

## 2025-03-13 ASSESSMENT — PAIN SCALES - GENERAL: PAINLEVEL_OUTOF10: MODERATE PAIN (6)

## 2025-03-13 ASSESSMENT — ENCOUNTER SYMPTOMS
NECK PAIN: 1
CONSTITUTIONAL NEGATIVE: 1
CARDIOVASCULAR NEGATIVE: 1
GASTROINTESTINAL NEGATIVE: 1
EYES NEGATIVE: 1
RESPIRATORY NEGATIVE: 1
BACK PAIN: 1

## 2025-03-13 NOTE — PROGRESS NOTES
Assessment & Plan   Problem List Items Addressed This Visit       Neck pain    Cervical stenosis of spinal canal - Primary    Relevant Orders    Adult Neurosurgery  Referral    MR Cervical Spine w/o & w Contrast     Other Visit Diagnoses       Radicular low back pain        Relevant Orders    Physical Therapy  Referral              Diagnoses and all orders for this visit:  Cervical stenosis of spinal canal  -     Adult Neurosurgery  Referral; Future  -     MR Cervical Spine w/o & w Contrast; Future  Neck pain  Radicular low back pain  -     Physical Therapy  Referral; Future    At this point I will order an MRI of her cervical spine and refer her to [facility] for definitive care.  Concerning her back I advised her to continue with her ibuprofen and use Tylenol for acute pain I will refer to physical therapy.  If she fails 6 weeks of conservative therapy we will then consider doing an MRI of her lower back.  Patient did have an epidural for delivery back in January however pain predated that back to April 2024      I spent approximately 32 minutes with this patient in face-to-face contact.    Subjective   Flaquita Sharma A 34 year old female    Presents initially for having cervical pain.  She has had cervical pain in her neck rating down her right arm she states she has numbness throughout the entire arm her pain in her neck is any worse from a 5 out of 10 to 6 out of 10.  She states that she did have surgery hide reviewed an MRI dating back to 2021.  Surgery was done at [facility] orth[facility].  She is also states that she is having radicular back pain she states back in April she had an x-ray after she felt back pain getting up from a seated position.  She rates her lower back pain as being 4-5 out of 10.  It radiates down the right leg.  At that time she had an x-ray which showed spondylolisthesis.  She is currently breast-feeding and is taking ibuprofen 600 mg 3 times a  "day.    Back Pain     History of Present Illness       Back Pain:  She presents for follow up of back pain. Patient's back pain is a chronic problem.  Location of back pain:  Right lower back, right side of neck, right shoulder, right buttock and right hip  Description of back pain: burning, dull ache and shooting  Back pain spreads: right buttocks, right shoulder and right side of neck    Since patient first noticed back pain, pain is: rapidly worsening  Does back pain interfere with her job:  Yes       Headaches:   Since the patient's last clinic visit, headaches are: worsened  The patient is getting headaches:  3+ days a week  She is able to do normal daily activities when she has a migraine.  The patient is taking the following rescue/relief medications:  Ibuprofen (Advil, Motrin)   Patient states \"The relief is inconsistent\" from the rescue/relief medications.   The patient is taking the following medications to prevent migraines:  No medications to prevent migraines  In the past 4 weeks, the patient has gone to an Urgent Care or Emergency Room 0 times times due to headaches.    She eats 2-3 servings of fruits and vegetables daily.She consumes 3 sweetened beverage(s) daily.She exercises with enough effort to increase her heart rate 20 to 29 minutes per day.  She exercises with enough effort to increase her heart rate 4 days per week.   She is taking medications regularly.    Past Medical History:   Diagnosis Date    Depressive disorder     Genital herpes     Partner also has genital herpes    Postpartum depression     Varicella       reports that she has quit smoking. Her smoking use included cigarettes. She has never used smokeless tobacco. She reports that she does not currently use alcohol. She reports that she does not use drugs.  Family History   Problem Relation Age of Onset    No Known Problems Mother     No Known Problems Father      Allergies   Allergen Reactions    Nitrofurantoin Hives     hives    " "Sulfa Antibiotics Hives and Swelling     States her lips became swollen. thrush on lips and hives form sulfa    Bactrim [Sulfamethoxazole-Trimethoprim] Hives       Current Outpatient Medications:     albuterol (PROAIR HFA/PROVENTIL HFA/VENTOLIN HFA) 108 (90 Base) MCG/ACT inhaler, Inhale 1-2 puffs into the lungs every 4 hours as needed for shortness of breath, wheezing or cough., Disp: 18 g, Rfl: 0    chlorhexidine (PERIDEX) 0.12 % solution, daily as needed., Disp: , Rfl:     magnesium 250 MG tablet, Take 1 tablet by mouth daily., Disp: , Rfl:     Prenatal Vit-Fe Fumarate-FA (PRENATAL MULTIVITAMIN  PLUS IRON) 27-1 MG TABS, Take 1 tablet by mouth daily, Disp: , Rfl:     metoclopramide (REGLAN) 10 MG tablet, Take 1 tablet (10 mg) by mouth 3 times daily as needed (headache) (Patient not taking: Reported on 3/13/2025), Disp: 60 tablet, Rfl: 0  Review of Systems   Constitutional: Negative.    HENT: Negative.     Eyes: Negative.    Respiratory: Negative.     Cardiovascular: Negative.    Gastrointestinal: Negative.    Musculoskeletal:  Positive for back pain and neck pain.         Objective      /74 (BP Location: Right arm, Patient Position: Sitting, Cuff Size: Adult Large)   Pulse 76   Temp 97.8  F (36.6  C) (Temporal)   Resp 14   Ht 1.727 m (5' 8\")   Wt 97.3 kg (214 lb 9.6 oz)   LMP 04/14/2024 (Exact Date)   SpO2 98%   BMI 32.63 kg/m    Body mass index is 32.63 kg/m .  Physical Exam  Constitutional:       Appearance: Normal appearance.   Skin:     General: Skin is warm and dry.   Neurological:      Mental Status: She is alert.      Comments: Strength is 5 out of 5 for resistance to pronation and supination of both hands.  Dorsiflexion and plantarflexion of both feet is 5 out of 5 flexion extension at the knees bilaterally is 5 out of 5.  Hip flexion is 5 out of 5.  Straight leg raise is negative right and left.  Deep tendon reflexes are symmetric at 2 out of 4 for patellar tendons right to left.  I was in " the ulnar radial and median nerve distributions of the arm.         Lab Results   Component Value Date    WBC 9.9 01/06/2025    HGB 11.8 01/07/2025     01/06/2025    ALT 11 07/10/2024    AST 14 07/10/2024     07/10/2024    BUN 8.3 07/10/2024    CO2 24 07/10/2024       Admission on 01/06/2025, Discharged on 01/08/2025   Component Date Value Ref Range Status    WBC Count 01/06/2025 9.9  4.0 - 11.0 10e3/uL Final    RBC Count 01/06/2025 4.18  3.80 - 5.20 10e6/uL Final    Hemoglobin 01/06/2025 12.4  11.7 - 15.7 g/dL Final    Hematocrit 01/06/2025 36.2  35.0 - 47.0 % Final    MCV 01/06/2025 87  78 - 100 fL Final    MCH 01/06/2025 29.7  26.5 - 33.0 pg Final    MCHC 01/06/2025 34.3  31.5 - 36.5 g/dL Final    RDW 01/06/2025 12.8  10.0 - 15.0 % Final    Platelet Count 01/06/2025 205  150 - 450 10e3/uL Final    Treponema Antibody Total 01/06/2025 Nonreactive  Nonreactive Final    ABO/RH(D) 01/06/2025 A POS   Final    Antibody Screen 01/06/2025 Negative  Negative Final    SPECIMEN EXPIRATION DATE 01/06/2025 [id number]   Final    Hemoglobin 01/07/2025 11.8  11.7 - 15.7 g/dL Final    GLUCOSE BY METER POCT 01/08/2025 79  70 - 99 mg/dL Final    Case Report 01/08/2025    Final                    Value:Surgical Pathology Report                         Case: [id number]                                  Authorizing Provider:  Rivka Ordonez MD        Collected:           01/08/2025 04:32 PM          Ordering Location:     [facility] and    Received:            01/09/2025 10:33 AM                                 Hospital                                                                     Pathologist:           Lab, External [facility]                                                                                   Pathologist                                                                  Specimens:   A) - Fallopian Tube, Right, RIGHT FALLOPIAN TUBE                                                    B) - Fallopian  "Tube, Left, LEFT FALLOPIAN TUBE                                             Final Diagnosis 01/08/2025    Final                    Value:See scanned report       Clinical Information 01/08/2025    Final                    Value:Procedure:  Minilaparotomy, bilateral salpingectomy - Bilateral  Pre-op Diagnosis: Admission for sterilization [Z30.2]  Post-op Diagnosis: Z30.2 - Admission for sterilization [ICD-10-CM]           No results for input(s): \"TSH\", \"UA\", \"PSA\", \"HGBA1C\" in the last 336 hours.    Invalid input(s): \"CBC\", \"CMP\", \"LIPIDS\", \"BMP\", \"MICROALBU\"       "

## 2025-03-13 NOTE — NURSING NOTE
"Chief Complaint   Patient presents with    Neck Pain    Back Pain     Low      Patient presents with low back and neck pain.  She is wanting an MRI referral.  Current pain rating 6/10.      Initial /74 (BP Location: Right arm, Patient Position: Sitting, Cuff Size: Adult Large)   Pulse 76   Temp 97.8  F (36.6  C) (Temporal)   Resp 14   Ht 1.727 m (5' 8\")   Wt 97.3 kg (214 lb 9.6 oz)   LMP 04/14/2024 (Exact Date)   SpO2 98%   BMI 32.63 kg/m   Estimated body mass index is 32.63 kg/m  as calculated from the following:    Height as of this encounter: 1.727 m (5' 8\").    Weight as of this encounter: 97.3 kg (214 lb 9.6 oz).  Medication Review: complete    The next two questions are to help us understand your food security.  If you are feeling you need any assistance in this area, we have resources available to support you today.          1/6/2025   SDOH- Food Insecurity   Within the past 12 months, did you worry that your food would run out before you got money to buy more? N   Within the past 12 months, did the food you bought just not last and you didn t have money to get more? N         Health Care Directive:  Patient does not have a Health Care Directive: Discussed advance care planning with patient; however, patient declined at this time.    Elizabeth Roy LPN on 3/13/2025 at 4:27 PM'        "

## 2025-03-26 ENCOUNTER — ANCILLARY ORDERS (OUTPATIENT)
Dept: RADIOLOGY | Facility: CLINIC | Age: 35
End: 2025-03-26
Payer: COMMERCIAL

## 2025-03-28 ENCOUNTER — ANCILLARY ORDERS (OUTPATIENT)
Dept: FAMILY MEDICINE | Facility: OTHER | Age: 35
End: 2025-03-28

## 2025-03-28 ENCOUNTER — HOSPITAL ENCOUNTER (OUTPATIENT)
Dept: MRI IMAGING | Facility: OTHER | Age: 35
Discharge: HOME | End: 2025-03-28
Attending: FAMILY MEDICINE | Admitting: FAMILY MEDICINE
Payer: COMMERCIAL

## 2025-03-28 DIAGNOSIS — M48.02 CERVICAL STENOSIS OF SPINAL CANAL: ICD-10-CM

## 2025-03-28 DIAGNOSIS — M54.10 RADICULAR LOW BACK PAIN: ICD-10-CM

## 2025-03-28 DIAGNOSIS — M54.2 NECK PAIN: ICD-10-CM

## 2025-03-28 DIAGNOSIS — M48.02 CERVICAL STENOSIS OF SPINAL CANAL: Primary | ICD-10-CM

## 2025-03-28 PROCEDURE — 72141 MRI NECK SPINE W/O DYE: CPT

## 2025-04-23 ENCOUNTER — OFFICE VISIT (OUTPATIENT)
Dept: FAMILY MEDICINE | Facility: OTHER | Age: 35
End: 2025-04-23
Attending: NURSE PRACTITIONER
Payer: COMMERCIAL

## 2025-04-23 VITALS
SYSTOLIC BLOOD PRESSURE: 118 MMHG | BODY MASS INDEX: 32.43 KG/M2 | OXYGEN SATURATION: 97 % | RESPIRATION RATE: 20 BRPM | HEART RATE: 71 BPM | TEMPERATURE: 98.6 F | HEIGHT: 68 IN | DIASTOLIC BLOOD PRESSURE: 68 MMHG | WEIGHT: 214 LBS

## 2025-04-23 DIAGNOSIS — J01.10 ACUTE NON-RECURRENT FRONTAL SINUSITIS: Primary | ICD-10-CM

## 2025-04-23 DIAGNOSIS — T36.95XA ANTIBIOTIC-INDUCED YEAST INFECTION: ICD-10-CM

## 2025-04-23 DIAGNOSIS — B37.9 ANTIBIOTIC-INDUCED YEAST INFECTION: ICD-10-CM

## 2025-04-23 RX ORDER — PREDNISONE 20 MG/1
20 TABLET ORAL DAILY
Qty: 7 TABLET | Refills: 0 | Status: SHIPPED | OUTPATIENT
Start: 2025-04-23 | End: 2025-04-30

## 2025-04-23 RX ORDER — FLUCONAZOLE 150 MG/1
150 TABLET ORAL
Qty: 3 TABLET | Refills: 0 | Status: SHIPPED | OUTPATIENT
Start: 2025-04-23 | End: 2025-04-30

## 2025-04-23 ASSESSMENT — PAIN SCALES - GENERAL: PAINLEVEL_OUTOF10: MODERATE PAIN (5)

## 2025-04-23 ASSESSMENT — ENCOUNTER SYMPTOMS
CONSTITUTIONAL NEGATIVE: 1
HEMATOLOGIC/LYMPHATIC NEGATIVE: 1
FACIAL SWELLING: 1
CARDIOVASCULAR NEGATIVE: 1
SINUS PAIN: 1
GASTROINTESTINAL NEGATIVE: 1
PSYCHIATRIC NEGATIVE: 1
NEUROLOGICAL NEGATIVE: 1
RESPIRATORY NEGATIVE: 1
ENDOCRINE NEGATIVE: 1
SINUS PRESSURE: 1
RHINORRHEA: 1
MUSCULOSKELETAL NEGATIVE: 1

## 2025-04-23 NOTE — PROGRESS NOTES
Flaquita Shetty  1990    ASSESSMENT/PLAN      Presents to [facility] clinic with symptoms of a cold, cough, sinus pain and pressure worsening over the last 2 weeks with significant nasal pressure.  Patient has had no fever or chills.  No cough.  Patient has no known exposure to specific illness.  Given patient's symptoms we will treat for sinusitis with Augmentin and add prednisone if not significantly improving in 48 hours.  Patient's vitals are stable and she appears nontoxic.      1. Acute non-recurrent frontal sinusitis (Primary)    - amoxicillin-clavulanate (AUGMENTIN) 875-125 MG tablet; Take 1 tablet by mouth 2 times daily for 10 days.  Dispense: 20 tablet; Refill: 0  - predniSONE (DELTASONE) 20 MG tablet; Take 1 tablet (20 mg) by mouth daily for 7 days.  Dispense: 7 tablet; Refill: 0   - May use over-the-counter Tylenol or ibuprofen PRN  - Follow up as needed for new or worsening symptoms        *A shared decision making model was used.   *Patient and/or associated parties understood and were agreeable to treatment plan.   *Plan and all results were discussed.   *Explanation of diagnosis, treatment options and risk and benefits of medications reviewed with patient.    *Time was taken to answer all questions.   *Red flags symptoms were discussed and patient was advised when they should return for reevaluation or for prompt emergency evaluation.   *Patient was given verbal and written instructions on plan of care. Instructions were printed or are available on Mychart on electronic AVS.   *We discussed potential side effects of any prescribed or recommended therapies, as well as expectations for response to treatments.  *Patient discharged in stable condition    Tawnya Mcrae CNP  [facility]    SUBJECTIVE  CHIEF COMPLAINT/ REASON FOR VISIT  Patient presents with:  Nose Problem     HISTORY OF PRESENT ILLNESS  Flaquita Shetty is a pleasant 34 year old female presents to [facility] clinic today with  "symptoms of a cold, cough, sinus pain and pressure worsening over the last 2 weeks with significant nasal pressure.  Patient has had no fever or chills.  No cough.  Patient has no known exposure to specific illness.        I have reviewed the nursing notes.  I have reviewed allergies, medication list, problem list, and past medical history.    REVIEW OF SYSTEMS  Review of Systems   Constitutional: Negative.    HENT:  Positive for congestion, facial swelling, rhinorrhea, sinus pressure and sinus pain.    Respiratory: Negative.     Cardiovascular: Negative.    Gastrointestinal: Negative.    Endocrine: Negative.    Genitourinary: Negative.    Musculoskeletal: Negative.    Neurological: Negative.    Hematological: Negative.    Psychiatric/Behavioral: Negative.     All other systems reviewed and are negative.       VITAL SIGNS  Vitals:    04/23/25 1554   BP: 118/68   Pulse: 71   Resp: 20   Temp: 98.6  F (37  C)   TempSrc: Tympanic   SpO2: 97%   Weight: 97.1 kg (214 lb)   Height: 1.727 m (5' 8\")      Body mass index is 32.54 kg/m .      OBJECTIVE  PHYSICAL EXAM  Physical Exam  Vitals and nursing note reviewed.   Constitutional:       Appearance: Normal appearance.   HENT:      Head: Normocephalic.      Right Ear: Tympanic membrane normal.      Left Ear: Tympanic membrane normal.      Nose: Congestion and rhinorrhea present.      Right Sinus: Maxillary sinus tenderness and frontal sinus tenderness present.      Left Sinus: Maxillary sinus tenderness and frontal sinus tenderness present.      Mouth/Throat:      Mouth: Mucous membranes are moist.   Eyes:      Pupils: Pupils are equal, round, and reactive to light.   Cardiovascular:      Rate and Rhythm: Normal rate and regular rhythm.      Pulses: Normal pulses.      Heart sounds: Normal heart sounds.   Pulmonary:      Effort: Pulmonary effort is normal.      Breath sounds: Normal breath sounds.   Abdominal:      Palpations: Abdomen is soft.   Musculoskeletal:         " General: Normal range of motion.      Cervical back: Normal range of motion.   Skin:     General: Skin is warm and dry.      Capillary Refill: Capillary refill takes less than 2 seconds.   Neurological:      General: No focal deficit present.      Mental Status: She is alert.

## 2025-04-23 NOTE — NURSING NOTE
"Chief Complaint   Patient presents with    Nose Problem     Patient here for nose pain x2 days. Notes tender to touch on bridge of nose.     Initial /68   Pulse 71   Temp 98.6  F (37  C) (Tympanic)   Resp 20   Ht 1.727 m (5' 8\")   Wt 97.1 kg (214 lb)   SpO2 97%   Breastfeeding Yes   BMI 32.54 kg/m   Estimated body mass index is 32.54 kg/m  as calculated from the following:    Height as of this encounter: 1.727 m (5' 8\").    Weight as of this encounter: 97.1 kg (214 lb).  Medication Review: complete    The next two questions are to help us understand your food security.  If you are feeling you need any assistance in this area, we have resources available to support you today.          1/6/2025   SDOH- Food Insecurity   Within the past 12 months, did you worry that your food would run out before you got money to buy more? N   Within the past 12 months, did the food you bought just not last and you didn t have money to get more? N         Health Care Directive:  Patient does not have a Health Care Directive: Discussed advance care planning with patient; however, patient declined at this time.    Jelly Sandoval LPN      "

## 2025-04-28 ENCOUNTER — THERAPY VISIT (OUTPATIENT)
Dept: PHYSICAL THERAPY | Facility: OTHER | Age: 35
End: 2025-04-28
Attending: FAMILY MEDICINE
Payer: COMMERCIAL

## 2025-04-28 DIAGNOSIS — M25.551 HIP PAIN, RIGHT: ICD-10-CM

## 2025-04-28 DIAGNOSIS — M54.2 NECK PAIN: ICD-10-CM

## 2025-04-28 DIAGNOSIS — R32 INCONTINENCE IN FEMALE: ICD-10-CM

## 2025-04-28 DIAGNOSIS — M54.10 RADICULAR LOW BACK PAIN: Primary | ICD-10-CM

## 2025-04-28 PROCEDURE — 97110 THERAPEUTIC EXERCISES: CPT | Mod: GP | Performed by: PHYSICAL THERAPIST

## 2025-04-28 PROCEDURE — 97161 PT EVAL LOW COMPLEX 20 MIN: CPT | Mod: GP | Performed by: PHYSICAL THERAPIST

## 2025-04-28 NOTE — PROGRESS NOTES
PHYSICAL THERAPY EVALUATION  Type of Visit: Evaluation          Fall Risk Screen:       Subjective         Presenting condition or subjective complaint:    Date of onset:      Relevant medical history:   depression  Dates & types of surgery:  C5-7 fusion in 2021  Precautions:  none     Prior diagnostic imaging/testing results:     XR - show mild to moderate L5-S1 changes  Prior therapy history for the same diagnosis, illness or injury:        Prior level of function: independent  Employment:    Teacher [employer]  Hobbies/Interests:  being outside, hanging with 3 kids    Patient goals for therapy:  get pain to go away    Pain assessment:  4/10 on average  Makes it worse: bending, lifting, sitting, walking, carrying, bending   Makes it better: heat, cold, OTC       Objective   OBSERVATION: Otherwise healthy looking individual with grimaces that occur during transitions    SENSATION: Reports pain down the posterior thigh and down the lateral lower leg/ankle at times potentially following L5 or S1 dermatome     GAIT: Lack of arm swing in right arm, rigid trunk     PALPATION: Tender at right glute med/min and bilateral lumbarparaspinals worse lower than higher. Spring testing to spine shows no pan in thoracic and upper lumbar and gradual increase to lower lumbar with worse at L5. Right sided SI joint spring testing causes pain while left and central decrease pain    Standing Lumbar ROM   Movement Restriction Symptoms   Flexion 10-25% No Change     Extension % Extreme Increase   Right SB 0-10% Moderate Increase  Down the leg too   Left SB 0-10% Slight Decrease   Right Rotation 0-10% Moderate Increase   Down the leg   Left Rotation 0-10% Slight Decrease     Hip PROM   Right Left    Ext-Flex 10-0-130 10-0-130    ER90 70 70    IR90 20 40        Myotomes Comments   Hip Flexion (L2)    Knee Extension (L3)    Ankle DF (L4)    Great Toe DF (L5)    Ankle PF (S1)    Knee Flexion (S2)      Hip Strength   Right Left  Comments   Flexion 5/5, strong 5/5, strong Pain during stabilization   Extension Not Tested Not Tested    ER 4/5, mod resist 4/5, mod resist    IR 5/5, strong 5/5, strong    ABD 3+/5, very slight resistance 4+/5, near max resist      FUNCTIONAL TESTS:   SLS:  R = pain, L = good    Outcome Measure:  Modified Oswestry = 26%    Assessment & Plan   CLINICAL IMPRESSIONS  Medical Diagnosis:      Treatment Diagnosis:     Impression/Assessment: Patient is a 35 year old female presenting with referring diagnosis of lumbar radiculopathy, cervical radiculopathy, right hip pain, and pelvic floor dysfunction.  The following significant findings have been identified: pain, ROM deficits, strength deficits, gait abnormality, inflammation, decreased activity tolerance, and impaired balance. These impairments interfere with their ability to perform walking, squatting, stairs, transfers, household work, self-cares, recreational activity, pushing, pulling, reaching, lifting, and bending as compared to previous level of function.     Clinical Decision Making (Complexity):  Clinical Presentation: Stable/Uncomplicated  Clinical Presentation Rationale: based on medical and personal factors listed in PT evaluation  Clinical Decision Making (Complexity): Low complexity    PLAN OF CARE  Treatment Interventions:  Modalities: Cryotherapy, Cupping, Dry Needling, E-stim, Hot Pack, Vasoneumatic Device  Interventions: Gait Training, Manual Therapy, Neuromuscular Re-education, Therapeutic Activity, Therapeutic Exercise, Aquatic Therapy    Long Term Goals     PT Goal 1  Goal Identifier: Short Term Goal 1  Target Date: 05/28/25  PT Goal 2  Goal Identifier: Short Term Goal 2  Target Date: 05/28/25  PT Goal 3  Goal Identifier: Long Term Goal 1  Target Date: 07/21/25  PT Goal 4  Goal Identifier: Long Term Goal 2  Target Date: 07/21/25      Frequency of Treatment: 1-2x/week  Duration of Treatment: 12 weeks    Education Assessment:   Learner/Method:  Patient;Listening;Reading;Demonstration;Pictures/Video    Risks and benefits of evaluation/treatment have been explained.   Patient/Family/caregiver agrees with Plan of Care.     Evaluation Time:           Signing Clinician: MAKEDA [facility]                                                                                   OUTPATIENT PHYSICAL THERAPY      PLAN OF TREATMENT FOR OUTPATIENT REHABILITATION   Patient's Last Name, First Name, TONIAKarenANGEKaren ShettyFlaquitapoly WOOD YOB: 1990   Provider's Name   [facility]   Medical Record No.  [id number]     Onset Date:    Start of Care Date: 04/28/25     Medical Diagnosis:         PT Treatment Diagnosis:    Plan of Treatment  Frequency/Duration: 1-2x/week/ 12 weeks    Certification date from 04/28/25 to 07/21/25         See note for plan of treatment details and functional goals     Cullen Maxwell, PT                         I CERTIFY THE NEED FOR THESE SERVICES FURNISHED UNDER        THIS PLAN OF TREATMENT AND WHILE UNDER MY CARE     (Physician attestation of this document indicates review and certification of the therapy plan).              Referring Provider:  Simone Marin    Initial Assessment  See Epic Evaluation- Start of Care Date: 04/28/25

## 2025-04-29 PROBLEM — M25.551 HIP PAIN, RIGHT: Status: ACTIVE | Noted: 2025-04-29

## 2025-04-29 PROBLEM — R32 INCONTINENCE IN FEMALE: Status: ACTIVE | Noted: 2025-04-29

## 2025-05-06 ENCOUNTER — THERAPY VISIT (OUTPATIENT)
Dept: PHYSICAL THERAPY | Facility: OTHER | Age: 35
End: 2025-05-06
Attending: FAMILY MEDICINE
Payer: COMMERCIAL

## 2025-05-06 DIAGNOSIS — M25.551 HIP PAIN, RIGHT: ICD-10-CM

## 2025-05-06 DIAGNOSIS — M54.10 RADICULAR LOW BACK PAIN: Primary | ICD-10-CM

## 2025-05-06 DIAGNOSIS — R32 INCONTINENCE IN FEMALE: ICD-10-CM

## 2025-05-06 PROCEDURE — 97140 MANUAL THERAPY 1/> REGIONS: CPT | Mod: GP | Performed by: PHYSICAL THERAPIST

## 2025-05-06 PROCEDURE — 97110 THERAPEUTIC EXERCISES: CPT | Mod: GP | Performed by: PHYSICAL THERAPIST

## 2025-05-12 ENCOUNTER — THERAPY VISIT (OUTPATIENT)
Dept: PHYSICAL THERAPY | Facility: OTHER | Age: 35
End: 2025-05-12
Attending: FAMILY MEDICINE
Payer: COMMERCIAL

## 2025-05-12 DIAGNOSIS — R32 INCONTINENCE IN FEMALE: ICD-10-CM

## 2025-05-12 DIAGNOSIS — M25.551 HIP PAIN, RIGHT: ICD-10-CM

## 2025-05-12 DIAGNOSIS — M54.10 RADICULAR LOW BACK PAIN: Primary | ICD-10-CM

## 2025-05-12 PROCEDURE — 97112 NEUROMUSCULAR REEDUCATION: CPT | Mod: GP | Performed by: PHYSICAL THERAPIST

## 2025-06-21 ENCOUNTER — HEALTH MAINTENANCE LETTER (OUTPATIENT)
Age: 35
End: 2025-06-21

## 2025-06-23 ENCOUNTER — MEDICAL CORRESPONDENCE (OUTPATIENT)
Dept: HEALTH INFORMATION MANAGEMENT | Facility: OTHER | Age: 35
End: 2025-06-23
Payer: COMMERCIAL

## 2025-08-27 ENCOUNTER — OFFICE VISIT (OUTPATIENT)
Dept: PSYCHIATRY | Facility: OTHER | Age: 35
End: 2025-08-27
Attending: NURSE PRACTITIONER
Payer: COMMERCIAL

## 2025-08-27 VITALS
SYSTOLIC BLOOD PRESSURE: 122 MMHG | OXYGEN SATURATION: 98 % | WEIGHT: 210 LBS | DIASTOLIC BLOOD PRESSURE: 76 MMHG | TEMPERATURE: 97.6 F | HEIGHT: 68 IN | HEART RATE: 66 BPM | RESPIRATION RATE: 16 BRPM | BODY MASS INDEX: 31.83 KG/M2

## 2025-08-27 DIAGNOSIS — F41.9 ANXIETY: ICD-10-CM

## 2025-08-27 DIAGNOSIS — F32.89 OTHER DEPRESSION: ICD-10-CM

## 2025-08-27 DIAGNOSIS — F90.0 ADHD (ATTENTION DEFICIT HYPERACTIVITY DISORDER), INATTENTIVE TYPE: Primary | ICD-10-CM

## 2025-08-27 DIAGNOSIS — Z79.899 CONTROLLED SUBSTANCE AGREEMENT SIGNED: ICD-10-CM

## 2025-08-27 LAB
AMPHETAMINES UR QL SCN: NORMAL
BARBITURATES UR QL SCN: NORMAL
BENZODIAZ UR QL SCN: NORMAL
BZE UR QL SCN: NORMAL
CANNABINOIDS UR QL SCN: NORMAL
FENTANYL UR QL: NORMAL
OPIATES UR QL SCN: NORMAL
PCP QUAL URINE (ROCHE): NORMAL

## 2025-08-27 PROCEDURE — 80307 DRUG TEST PRSMV CHEM ANLYZR: CPT | Mod: ZL | Performed by: NURSE PRACTITIONER

## 2025-08-27 RX ORDER — LISDEXAMFETAMINE DIMESYLATE 20 MG/1
20 CAPSULE ORAL EVERY MORNING
Qty: 30 CAPSULE | Refills: 0 | Status: SHIPPED | OUTPATIENT
Start: 2025-08-27

## 2025-08-27 ASSESSMENT — ANXIETY QUESTIONNAIRES
GAD7 TOTAL SCORE: 18
1. FEELING NERVOUS, ANXIOUS, OR ON EDGE: NEARLY EVERY DAY
2. NOT BEING ABLE TO STOP OR CONTROL WORRYING: NEARLY EVERY DAY
5. BEING SO RESTLESS THAT IT IS HARD TO SIT STILL: NEARLY EVERY DAY
7. FEELING AFRAID AS IF SOMETHING AWFUL MIGHT HAPPEN: SEVERAL DAYS
7. FEELING AFRAID AS IF SOMETHING AWFUL MIGHT HAPPEN: SEVERAL DAYS
4. TROUBLE RELAXING: NEARLY EVERY DAY
GAD7 TOTAL SCORE: 18
IF YOU CHECKED OFF ANY PROBLEMS ON THIS QUESTIONNAIRE, HOW DIFFICULT HAVE THESE PROBLEMS MADE IT FOR YOU TO DO YOUR WORK, TAKE CARE OF THINGS AT HOME, OR GET ALONG WITH OTHER PEOPLE: VERY DIFFICULT
6. BECOMING EASILY ANNOYED OR IRRITABLE: MORE THAN HALF THE DAYS
3. WORRYING TOO MUCH ABOUT DIFFERENT THINGS: NEARLY EVERY DAY
GAD7 TOTAL SCORE: 18
8. IF YOU CHECKED OFF ANY PROBLEMS, HOW DIFFICULT HAVE THESE MADE IT FOR YOU TO DO YOUR WORK, TAKE CARE OF THINGS AT HOME, OR GET ALONG WITH OTHER PEOPLE?: VERY DIFFICULT

## 2025-08-27 ASSESSMENT — PAIN SCALES - GENERAL: PAINLEVEL_OUTOF10: NO PAIN (0)

## 2025-08-27 ASSESSMENT — PATIENT HEALTH QUESTIONNAIRE - PHQ9
10. IF YOU CHECKED OFF ANY PROBLEMS, HOW DIFFICULT HAVE THESE PROBLEMS MADE IT FOR YOU TO DO YOUR WORK, TAKE CARE OF THINGS AT HOME, OR GET ALONG WITH OTHER PEOPLE: VERY DIFFICULT
SUM OF ALL RESPONSES TO PHQ QUESTIONS 1-9: 16
SUM OF ALL RESPONSES TO PHQ QUESTIONS 1-9: 16

## (undated) DEVICE — PACK MAJOR LAPAROTOMY LF SBA15MLFCA

## (undated) DEVICE — LIGASURE SMALL JAW

## (undated) DEVICE — PENCIL MEGADYNE TELESCOPING SMOKE EVACUATION 10 FT 251010J

## (undated) DEVICE — SU MONOCRYL 3-0 PS-2 27" Y427H

## (undated) DEVICE — GLOVE PROTEXIS BLUE W/NEU-THERA 6.5  2D73EB65

## (undated) DEVICE — Device

## (undated) DEVICE — SU VICRYL 3-0 CT-2 27" UND J232H

## (undated) DEVICE — SUTURE 3-0 VICRYL TIES J910T

## (undated) DEVICE — ESU GROUND PAD ADULT W/CORD E7507

## (undated) DEVICE — GLOVE PROTEXIS POWDER FREE SMT 6.5  2D72PT65X

## (undated) DEVICE — PREP CHLORAPREP 26ML TINTED ORANGE  260815

## (undated) DEVICE — SLEEVE COMPRESSION SCD KNEE MED 74022

## (undated) DEVICE — SU VICRYL 0 UR-6 27" J603H

## (undated) DEVICE — DECANTER VIAL 2006S

## (undated) DEVICE — SU DERMABOND ADVANCED .7ML DNX12

## (undated) DEVICE — SOL WATER 1500ML

## (undated) RX ORDER — SODIUM CHLORIDE, SODIUM LACTATE, POTASSIUM CHLORIDE, CALCIUM CHLORIDE 600; 310; 30; 20 MG/100ML; MG/100ML; MG/100ML; MG/100ML
INJECTION, SOLUTION INTRAVENOUS
Status: DISPENSED
Start: 2025-01-06

## (undated) RX ORDER — FENTANYL CITRATE 50 UG/ML
INJECTION, SOLUTION INTRAMUSCULAR; INTRAVENOUS
Status: DISPENSED
Start: 2025-01-08

## (undated) RX ORDER — BUPIVACAINE HYDROCHLORIDE 2.5 MG/ML
INJECTION, SOLUTION EPIDURAL; INFILTRATION; INTRACAUDAL
Status: DISPENSED
Start: 2025-01-08

## (undated) RX ORDER — LIDOCAINE HYDROCHLORIDE 20 MG/ML
SOLUTION OROPHARYNGEAL
Status: DISPENSED
Start: 2024-11-08

## (undated) RX ORDER — KETOROLAC TROMETHAMINE 30 MG/ML
INJECTION, SOLUTION INTRAMUSCULAR; INTRAVENOUS
Status: DISPENSED
Start: 2025-01-08

## (undated) RX ORDER — PROPOFOL 10 MG/ML
INJECTION, EMULSION INTRAVENOUS
Status: DISPENSED
Start: 2025-01-08

## (undated) RX ORDER — DEXAMETHASONE SODIUM PHOSPHATE 4 MG/ML
INJECTION, SOLUTION INTRA-ARTICULAR; INTRALESIONAL; INTRAMUSCULAR; INTRAVENOUS; SOFT TISSUE
Status: DISPENSED
Start: 2025-01-08

## (undated) RX ORDER — ONDANSETRON 2 MG/ML
INJECTION INTRAMUSCULAR; INTRAVENOUS
Status: DISPENSED
Start: 2025-01-08

## (undated) RX ORDER — MAGNESIUM SULFATE HEPTAHYDRATE 40 MG/ML
INJECTION, SOLUTION INTRAVENOUS
Status: DISPENSED
Start: 2024-07-10

## (undated) RX ORDER — KETOROLAC TROMETHAMINE 30 MG/ML
INJECTION, SOLUTION INTRAMUSCULAR; INTRAVENOUS
Status: DISPENSED
Start: 2024-04-30